# Patient Record
Sex: MALE | Race: BLACK OR AFRICAN AMERICAN | NOT HISPANIC OR LATINO | Employment: OTHER | URBAN - METROPOLITAN AREA
[De-identification: names, ages, dates, MRNs, and addresses within clinical notes are randomized per-mention and may not be internally consistent; named-entity substitution may affect disease eponyms.]

---

## 2017-01-04 ENCOUNTER — TELEPHONE (OUTPATIENT)
Dept: PULMONOLOGY | Facility: CLINIC | Age: 72
End: 2017-01-04

## 2017-01-04 DIAGNOSIS — G47.33 OSA (OBSTRUCTIVE SLEEP APNEA): Primary | ICD-10-CM

## 2017-01-05 NOTE — TELEPHONE ENCOUNTER
----- Message from Sharmila Cadena sent at 1/4/2017  9:12 AM CST -----  Contact: Patient: [phone]  Pt stated he would like to try the CPAP machine again pt was last seen 11/2016   Please advise   ----- Message -----     From: Carmen Mayers     Sent: 1/3/2017  12:25 PM       To: Aaliyah Lino V Staff    Patient is calling in regards to see if he is getting a CPAP machine .  Patient can be reached at [phone]. Please call.    Thanks

## 2017-02-07 ENCOUNTER — OFFICE VISIT (OUTPATIENT)
Dept: UROLOGY | Facility: CLINIC | Age: 72
End: 2017-02-07
Payer: MEDICARE

## 2017-02-07 VITALS — WEIGHT: 249 LBS | BODY MASS INDEX: 40.02 KG/M2 | HEIGHT: 66 IN

## 2017-02-07 DIAGNOSIS — N13.8 BPH WITH OBSTRUCTION/LOWER URINARY TRACT SYMPTOMS: Primary | ICD-10-CM

## 2017-02-07 DIAGNOSIS — N40.1 BPH WITH OBSTRUCTION/LOWER URINARY TRACT SYMPTOMS: Primary | ICD-10-CM

## 2017-02-07 DIAGNOSIS — R35.1 NOCTURIA MORE THAN TWICE PER NIGHT: ICD-10-CM

## 2017-02-07 DIAGNOSIS — R33.9 INCOMPLETE EMPTYING OF BLADDER: ICD-10-CM

## 2017-02-07 PROCEDURE — 99214 OFFICE O/P EST MOD 30 MIN: CPT | Mod: S$GLB,,, | Performed by: UROLOGY

## 2017-02-07 PROCEDURE — 99999 PR PBB SHADOW E&M-EST. PATIENT-LVL III: CPT | Mod: PBBFAC,,, | Performed by: UROLOGY

## 2017-02-07 RX ORDER — DOCUSATE SODIUM 100 MG/1
100 CAPSULE, LIQUID FILLED ORAL 2 TIMES DAILY
COMMUNITY

## 2017-02-07 RX ORDER — ALBUTEROL SULFATE 0.63 MG/3ML
0.63 SOLUTION RESPIRATORY (INHALATION) EVERY 6 HOURS PRN
COMMUNITY
End: 2018-03-15

## 2017-02-07 NOTE — MR AVS SNAPSHOT
[facility]  [address]  [address]  [place] [address]  Phone: [phone]                  Flaquito Jones   2017 11:00 AM   Office Visit    Description:  Male : 1945   Provider:  JEB Lucero MD   Department:  [facility]           Reason for Visit     Benign Prostatic Hypertrophy           Diagnoses this Visit        Comments    BPH with obstruction/lower urinary tract symptoms    -  Primary     Nocturia more than twice per night         Incomplete emptying of bladder                To Do List           Future Appointments        Provider Department Dept Phone    2017 10:00 AM Harry Fernández MD [facility] [phone]      Goals (5 Years of Data)     None      Follow-Up and Disposition     Return in about 6 months (around 2017) for Review PSA.      [facility]     [facility] -  Assistance  Registered nurses in the [facility] provide clinical advisement, health education, appointment booking, and other advisory services.  Call for this free service at [phone].             Medications           Message regarding Medications     Verify the changes and/or additions to your medication regime listed below are the same as discussed with your clinician today.  If any of these changes or additions are incorrect, please notify your healthcare provider.        STOP taking these medications     lisinopril-hydrochlorothiazide (PRINZIDE,ZESTORETIC) 10-12.5 mg per tablet Take 1 tablet by mouth once daily.      lovastatin (MEVACOR) 10 MG tablet Take 10 mg by mouth every evening.    lubiprostone (AMITIZA) 24 MCG Cap Take 24 mcg by mouth 2 (two) times daily with meals.      metoprolol tartrate (LOPRESSOR) 25 MG tablet take 1/2 TABLET(S) BY MOUTH TWICE DAILY    METOPROLOL TARTRATE ORAL Take 15 mg by mouth once daily.    oxycodone-acetaminophen  mg (PERCOCET)  mg per tablet Take 1 tablet by mouth 2  "(two) times daily as needed.      tiotropium (SPIRIVA) 18 mcg inhalation capsule Inhale 18 mcg into the lungs once daily.           Verify that the below list of medications is an accurate representation of the medications you are currently taking.  If none reported, the list may be blank. If incorrect, please contact your healthcare provider. Carry this list with you in case of emergency.           Current Medications     albuterol (ACCUNEB) 0.63 mg/3 mL Nebu Take 0.63 mg by nebulization every 6 (six) hours as needed. Rescue    allopurinol (ZYLOPRIM) 100 MG tablet TAKE 1 TABLET(S) BY MOUTH ONCE A DAY to prevent gout    atorvastatin (LIPITOR) 40 MG tablet Take 40 mg by mouth once daily.    bethanechol (URECHOLINE) 25 MG Tab Take 50 mg by mouth 3 (three) times daily.    bethanechol (URECHOLINE) 50 MG tablet Take 1 tablet (50 mg total) by mouth 3 (three) times daily.    docusate sodium (COLACE) 100 MG capsule Take 100 mg by mouth 2 (two) times daily.    fish oil-omega-3 fatty acids 300-1,000 mg capsule Take 1 g by mouth 2 (two) times daily.      furosemide (LASIX) 40 MG tablet Take 1 tablet (40 mg total) by mouth once daily.    gabapentin (NEURONTIN) 300 MG capsule Take 300 mg by mouth 3 (three) times daily.    multivitamin capsule Take 1 capsule by mouth once daily.    omeprazole (PRILOSEC) 20 MG capsule Take 20 mg by mouth once daily.    tamsulosin (FLOMAX) 0.4 mg Cp24 Take 1 capsule (0.4 mg total) by mouth once daily.    warfarin (COUMADIN) 5 MG tablet            Clinical Reference Information           Your Vitals Were     Height Weight BMI          5' 6" (1.676 m) 112.9 kg (249 lb) 40.19 kg/m2        Allergies as of 2/7/2017     No Known Allergies      Immunizations Administered on Date of Encounter - 2/7/2017     None      Orders Placed During Today's Visit     Future Labs/Procedures Expected by Expires    Prostate Specific Antigen, Diagnostic  8/6/2017 2/7/2018      [facility] Sign-Up     Activating your " [facility] account is as easy as 1-2-3!     1) Visit [email or web address], select Sign Up Now, enter this activation code and your date of birth, then select Next.  [email or web address]  Expires: 3/24/2017 11:14 AM      2) Create a username and password to use when you visit [facility] in the future and select a security question in case you lose your password and select Next.    3) Enter your e-mail address and click Sign Up!    Additional Information  If you have questions, please e-mail [email or web address] or call [phone] to talk to our [facility] staff. Remember, [facility] is NOT to be used for urgent needs. For medical emergencies, dial 911.         Language Assistance Services     ATTENTION: Language assistance services are available, free of charge. Please call [phone].      ATENCIÓN: Si habla español, tiene a hylton disposición servicios gratuitos de asistencia lingüística. Llame al [phone].     CHÚ Ý: N?u b?n nói Ti?ng Vi?t, có các d?ch v? h? tr? ngôn ng? mi?n phí dành cho b?n. G?i s? [phone].         [facility] complies with applicable Federal civil rights laws and does not discriminate on the basis of race, color, national origin, age, disability, or sex.

## 2017-02-07 NOTE — PROGRESS NOTES
Subjective:       Patient ID: Flaquito Jones is a 71 y.o. male who was last seen in this office 8/25/2016    Chief Complaint:   Chief Complaint   Patient presents with    Benign Prostatic Hypertrophy     6 month f/u        History of Present Illness  Benign Prostatic Hypertrophy  Patient complains of lower urinary tract symptoms. He reports frequency, incomplete emptying and nocturia two times a night. He denies straining and urgency. Patient states symptoms are of mild severity. Onset of symptoms was several years ago and was gradual in onset. He has no personal history and no family history of prostate cancer. He reports a history of no complicating symptoms. He denies flank pain, gross hematuria, kidney stones and recurrent UTI.  He does well with Flomax and Bethanechol.    ACTIVE MEDICAL ISSUES:  Patient Active Problem List   Diagnosis    Thoracic or lumbosacral neuritis or radiculitis, unspecified    DIVYA (obstructive sleep apnea)    Chronic diastolic congestive heart failure    Chest pain, atypical    Essential hypertension    HLD (hyperlipidemia)    DVT (deep venous thrombosis)       ALLERGIES AND MEDICATIONS: updated and reviewed.  Review of patient's allergies indicates:  No Known Allergies  Current Outpatient Prescriptions   Medication Sig    albuterol (ACCUNEB) 0.63 mg/3 mL Nebu Take 0.63 mg by nebulization every 6 (six) hours as needed. Rescue    allopurinol (ZYLOPRIM) 100 MG tablet TAKE 1 TABLET(S) BY MOUTH ONCE A DAY to prevent gout    atorvastatin (LIPITOR) 40 MG tablet Take 40 mg by mouth once daily.    bethanechol (URECHOLINE) 25 MG Tab Take 50 mg by mouth 3 (three) times daily.    bethanechol (URECHOLINE) 50 MG tablet Take 1 tablet (50 mg total) by mouth 3 (three) times daily.    docusate sodium (COLACE) 100 MG capsule Take 100 mg by mouth 2 (two) times daily.    fish oil-omega-3 fatty acids 300-1,000 mg capsule Take 1 g by mouth 2 (two) times daily.      furosemide (LASIX) 40 MG  "tablet Take 1 tablet (40 mg total) by mouth once daily.    gabapentin (NEURONTIN) 300 MG capsule Take 300 mg by mouth 3 (three) times daily.    multivitamin capsule Take 1 capsule by mouth once daily.    omeprazole (PRILOSEC) 20 MG capsule Take 20 mg by mouth once daily.    tamsulosin (FLOMAX) 0.4 mg Cp24 Take 1 capsule (0.4 mg total) by mouth once daily.    warfarin (COUMADIN) 5 MG tablet      No current facility-administered medications for this visit.        Review of Systems   Constitutional: Negative for activity change, fatigue, fever and unexpected weight change.   HENT: Negative for congestion.    Eyes: Negative for redness.   Respiratory: Negative for chest tightness and shortness of breath.    Cardiovascular: Negative for chest pain and leg swelling.   Gastrointestinal: Negative for abdominal pain, constipation, diarrhea, nausea and vomiting.   Genitourinary: Negative for dysuria, flank pain, frequency, hematuria, penile pain, penile swelling, scrotal swelling, testicular pain and urgency.   Musculoskeletal: Negative for arthralgias and back pain.   Neurological: Negative for dizziness and light-headedness.   Psychiatric/Behavioral: Negative for behavioral problems and confusion. The patient is not nervous/anxious.    All other systems reviewed and are negative.      Objective:      Vitals:    02/07/17 1107   Weight: 112.9 kg (249 lb)   Height: 5' 6" (1.676 m)     Physical Exam   Nursing note and vitals reviewed.  Constitutional: He is oriented to person, place, and time. He appears well-developed and well-nourished.   HENT:   Head: Normocephalic.   Eyes: Conjunctivae are normal.   Neck: Normal range of motion. Neck supple. No tracheal deviation present. No thyromegaly present.   Cardiovascular: Normal rate and normal heart sounds.    Pulmonary/Chest: Effort normal and breath sounds normal. No respiratory distress. He has no wheezes.   Abdominal: Soft. Bowel sounds are normal. There is no " hepatosplenomegaly. There is no tenderness. There is no rebound and no CVA tenderness. No hernia.   Musculoskeletal: Normal range of motion. He exhibits no edema or tenderness.   Wheel chair   Lymphadenopathy:     He has no cervical adenopathy.   Neurological: He is alert and oriented to person, place, and time.   Skin: Skin is warm and dry. No rash noted. No erythema.     Psychiatric: He has a normal mood and affect. His behavior is normal. Judgment and thought content normal.       Urine dipstick shows negative for all components.  Micro exam: negative for WBC's or RBC's.    Assessment:       1. BPH with obstruction/lower urinary tract symptoms    2. Nocturia more than twice per night    3. Incomplete emptying of bladder          Plan:       1. BPH with obstruction/lower urinary tract symptoms  Stay on Flomax and bethanechol    - Prostate Specific Antigen, Diagnostic; Future    2. Nocturia more than twice per night      3. Incomplete emptying of bladder            Return in about 6 months (around 8/7/2017) for Review PSA.

## 2017-03-09 ENCOUNTER — TELEPHONE (OUTPATIENT)
Dept: SLEEP MEDICINE | Facility: CLINIC | Age: 72
End: 2017-03-09

## 2017-03-09 NOTE — TELEPHONE ENCOUNTER
----- Message from Carmen Mayers sent at 3/9/2017  9:35 AM CST -----  Contact: Patient: [phone]  Patient is with EMS and is asking to be called back on [phone].    Thanks

## 2017-05-04 DIAGNOSIS — J44.9 COPD (CHRONIC OBSTRUCTIVE PULMONARY DISEASE): Primary | ICD-10-CM

## 2017-05-05 ENCOUNTER — HOSPITAL ENCOUNTER (OUTPATIENT)
Dept: RESPIRATORY THERAPY | Facility: HOSPITAL | Age: 72
Discharge: HOME | End: 2017-05-05
Payer: MEDICARE

## 2017-05-05 DIAGNOSIS — J44.9 COPD (CHRONIC OBSTRUCTIVE PULMONARY DISEASE): ICD-10-CM

## 2017-05-05 PROCEDURE — 94729 DIFFUSING CAPACITY: CPT

## 2017-05-05 PROCEDURE — 94010 BREATHING CAPACITY TEST: CPT

## 2017-05-05 PROCEDURE — 94727 GAS DIL/WSHOT DETER LNG VOL: CPT

## 2017-08-08 ENCOUNTER — OFFICE VISIT (OUTPATIENT)
Dept: UROLOGY | Facility: CLINIC | Age: 72
End: 2017-08-08
Payer: MEDICARE

## 2017-08-08 ENCOUNTER — TELEPHONE (OUTPATIENT)
Dept: UROLOGY | Facility: CLINIC | Age: 72
End: 2017-08-08

## 2017-08-08 VITALS
SYSTOLIC BLOOD PRESSURE: 122 MMHG | DIASTOLIC BLOOD PRESSURE: 72 MMHG | BODY MASS INDEX: 40.02 KG/M2 | HEART RATE: 62 BPM | WEIGHT: 249 LBS | HEIGHT: 66 IN

## 2017-08-08 DIAGNOSIS — N40.0 BPH WITHOUT URINARY OBSTRUCTION: Primary | ICD-10-CM

## 2017-08-08 DIAGNOSIS — R33.9 INCOMPLETE EMPTYING OF BLADDER: ICD-10-CM

## 2017-08-08 DIAGNOSIS — R35.1 NOCTURIA MORE THAN TWICE PER NIGHT: ICD-10-CM

## 2017-08-08 PROCEDURE — 1126F AMNT PAIN NOTED NONE PRSNT: CPT | Mod: S$GLB,,, | Performed by: UROLOGY

## 2017-08-08 PROCEDURE — 99214 OFFICE O/P EST MOD 30 MIN: CPT | Mod: S$GLB,,, | Performed by: UROLOGY

## 2017-08-08 PROCEDURE — 99999 PR PBB SHADOW E&M-EST. PATIENT-LVL III: CPT | Mod: PBBFAC,,, | Performed by: UROLOGY

## 2017-08-08 PROCEDURE — 1159F MED LIST DOCD IN RCRD: CPT | Mod: S$GLB,,, | Performed by: UROLOGY

## 2017-08-08 PROCEDURE — 3074F SYST BP LT 130 MM HG: CPT | Mod: S$GLB,,, | Performed by: UROLOGY

## 2017-08-08 PROCEDURE — 3078F DIAST BP <80 MM HG: CPT | Mod: S$GLB,,, | Performed by: UROLOGY

## 2017-08-08 RX ORDER — BETHANECHOL CHLORIDE 25 MG/1
50 TABLET ORAL 3 TIMES DAILY
Qty: 180 TABLET | Refills: 1 | Status: SHIPPED | OUTPATIENT
Start: 2017-08-08 | End: 2017-12-08 | Stop reason: SDUPTHER

## 2017-08-08 RX ORDER — TAMSULOSIN HYDROCHLORIDE 0.4 MG/1
0.4 CAPSULE ORAL DAILY
Qty: 90 CAPSULE | Refills: 3 | Status: SHIPPED | OUTPATIENT
Start: 2017-08-08 | End: 2018-03-15 | Stop reason: SDUPTHER

## 2017-08-08 NOTE — TELEPHONE ENCOUNTER
----- Message from Amanda Cramer sent at 8/8/2017  2:32 PM CDT -----  Contact: self  Requesting to speak to [place] - Refused to state the reason why except  that it is about labwork .      [phone]              LL

## 2017-08-08 NOTE — TELEPHONE ENCOUNTER
Patient wanted lab slip mailed for [facility] so he can get his labs in [facility].

## 2017-08-08 NOTE — PROGRESS NOTES
Subjective:       Patient ID: Flaquito Jones is a 72 y.o. male who was last seen in this office Visit date not found    Chief Complaint:   Chief Complaint   Patient presents with    Benign Prostatic Hypertrophy     6 month f/u        History of Present Illness  Benign Prostatic Hypertrophy  Patient complains of lower urinary tract symptoms. He reports frequency, incomplete emptying and nocturia two times a night. He denies straining and urgency. Patient states symptoms are of mild severity. Onset of symptoms was several years ago and was gradual in onset. He has no personal history and no family history of prostate cancer. He reports a history of no complicating symptoms. He denies flank pain, gross hematuria, kidney stones and recurrent UTI.  He does well with Flomax and Bethanechol.      ACTIVE MEDICAL ISSUES:  Patient Active Problem List   Diagnosis    Thoracic or lumbosacral neuritis or radiculitis, unspecified    DIVYA (obstructive sleep apnea)    Chronic diastolic congestive heart failure    Chest pain, atypical    Essential hypertension    HLD (hyperlipidemia)    DVT (deep venous thrombosis)       ALLERGIES AND MEDICATIONS: updated and reviewed.  Review of patient's allergies indicates:  No Known Allergies  Current Outpatient Prescriptions   Medication Sig    albuterol (ACCUNEB) 0.63 mg/3 mL Nebu Take 0.63 mg by nebulization every 6 (six) hours as needed. Rescue    allopurinol (ZYLOPRIM) 100 MG tablet TAKE 1 TABLET(S) BY MOUTH ONCE A DAY to prevent gout    atorvastatin (LIPITOR) 40 MG tablet Take 40 mg by mouth once daily.    bethanechol (URECHOLINE) 25 MG Tab Take 2 tablets (50 mg total) by mouth 3 (three) times daily.    docusate sodium (COLACE) 100 MG capsule Take 100 mg by mouth 2 (two) times daily.    fish oil-omega-3 fatty acids 300-1,000 mg capsule Take 1 g by mouth 2 (two) times daily.      gabapentin (NEURONTIN) 300 MG capsule Take 300 mg by mouth 3 (three) times daily.     "multivitamin capsule Take 1 capsule by mouth once daily.    omeprazole (PRILOSEC) 20 MG capsule Take 20 mg by mouth once daily.    tamsulosin (FLOMAX) 0.4 mg Cp24 Take 1 capsule (0.4 mg total) by mouth once daily.    warfarin (COUMADIN) 5 MG tablet     furosemide (LASIX) 40 MG tablet Take 1 tablet (40 mg total) by mouth once daily.     No current facility-administered medications for this visit.        Review of Systems   Constitutional: Negative for activity change, fatigue, fever and unexpected weight change.   HENT: Negative for congestion.    Eyes: Negative for redness.   Respiratory: Negative for chest tightness and shortness of breath.    Cardiovascular: Negative for chest pain and leg swelling.   Gastrointestinal: Negative for abdominal pain, constipation, diarrhea, nausea and vomiting.   Genitourinary: Negative for dysuria, flank pain, frequency, hematuria, penile pain, penile swelling, scrotal swelling, testicular pain and urgency.   Musculoskeletal: Negative for arthralgias and back pain.   Neurological: Negative for dizziness and light-headedness.   Psychiatric/Behavioral: Negative for behavioral problems and confusion. The patient is not nervous/anxious.    All other systems reviewed and are negative.      Objective:      Vitals:    08/08/17 1123   BP: 122/72   Pulse: 62   Weight: 112.9 kg (249 lb)   Height: 5' 6" (1.676 m)     Physical Exam   Nursing note and vitals reviewed.  Constitutional: He is oriented to person, place, and time. He appears well-developed and well-nourished.   HENT:   Head: Normocephalic.   Eyes: Conjunctivae are normal.   Neck: Normal range of motion. Neck supple. No tracheal deviation present. No thyromegaly present.   Cardiovascular: Normal rate and normal heart sounds.    Pulmonary/Chest: Effort normal and breath sounds normal. No respiratory distress. He has no wheezes.   Abdominal: Soft. Bowel sounds are normal. There is no hepatosplenomegaly. There is no tenderness. " There is no rebound and no CVA tenderness. No hernia.   Musculoskeletal: Normal range of motion. He exhibits no edema or tenderness.   Wheel chair   Lymphadenopathy:     He has no cervical adenopathy.   Neurological: He is alert and oriented to person, place, and time.   Skin: Skin is warm and dry. No rash noted. No erythema.     Psychiatric: He has a normal mood and affect. His behavior is normal. Judgment and thought content normal.       Urine dipstick shows negative for all components.  Micro exam: negative for WBC's or RBC's.    Assessment:       1. BPH without urinary obstruction    2. Incomplete emptying of bladder    3. Nocturia more than twice per night          Plan:       1. BPH without urinary obstruction  PSA   - tamsulosin (FLOMAX) 0.4 mg Cp24; Take 1 capsule (0.4 mg total) by mouth once daily.  Dispense: 90 capsule; Refill: 3  - bethanechol (URECHOLINE) 25 MG Tab; Take 2 tablets (50 mg total) by mouth 3 (three) times daily.  Dispense: 180 tablet; Refill: 1    2. Incomplete emptying of bladder      3. Nocturia more than twice per night  stable            Return in about 6 months (around 2/8/2018) for Follow up.

## 2017-11-15 ENCOUNTER — OFFICE VISIT (OUTPATIENT)
Dept: NEPHROLOGY | Facility: CLINIC | Age: 72
End: 2017-11-15
Payer: MEDICARE

## 2017-11-15 ENCOUNTER — LAB VISIT (OUTPATIENT)
Dept: LAB | Facility: HOSPITAL | Age: 72
End: 2017-11-15
Attending: INTERNAL MEDICINE
Payer: MEDICARE

## 2017-11-15 VITALS
SYSTOLIC BLOOD PRESSURE: 142 MMHG | WEIGHT: 245.13 LBS | HEIGHT: 66 IN | BODY MASS INDEX: 39.4 KG/M2 | HEART RATE: 72 BPM | DIASTOLIC BLOOD PRESSURE: 70 MMHG | OXYGEN SATURATION: 93 %

## 2017-11-15 DIAGNOSIS — N18.30 CHRONIC KIDNEY DISEASE, STAGE III (MODERATE): Primary | ICD-10-CM

## 2017-11-15 DIAGNOSIS — N18.30 CHRONIC KIDNEY DISEASE, STAGE III (MODERATE): ICD-10-CM

## 2017-11-15 LAB
BILIRUB UR QL STRIP: NEGATIVE
CLARITY UR REFRACT.AUTO: ABNORMAL
COLOR UR AUTO: YELLOW
CREAT UR-MCNC: 172 MG/DL
GLUCOSE UR QL STRIP: NEGATIVE
HGB UR QL STRIP: NEGATIVE
KETONES UR QL STRIP: NEGATIVE
LEUKOCYTE ESTERASE UR QL STRIP: NEGATIVE
NITRITE UR QL STRIP: NEGATIVE
PH UR STRIP: 6 [PH] (ref 5–8)
PROT UR QL STRIP: NEGATIVE
PROT UR-MCNC: 13 MG/DL
PROT/CREAT RATIO, UR: 0.08
SP GR UR STRIP: 1.02 (ref 1–1.03)
URN SPEC COLLECT METH UR: ABNORMAL
UROBILINOGEN UR STRIP-ACNC: 4 EU/DL

## 2017-11-15 PROCEDURE — 99999 PR PBB SHADOW E&M-EST. PATIENT-LVL III: CPT | Mod: PBBFAC,,, | Performed by: INTERNAL MEDICINE

## 2017-11-15 PROCEDURE — 81003 URINALYSIS AUTO W/O SCOPE: CPT

## 2017-11-15 PROCEDURE — 87086 URINE CULTURE/COLONY COUNT: CPT

## 2017-11-15 PROCEDURE — 87077 CULTURE AEROBIC IDENTIFY: CPT | Mod: 59

## 2017-11-15 PROCEDURE — 82570 ASSAY OF URINE CREATININE: CPT

## 2017-11-15 PROCEDURE — 99213 OFFICE O/P EST LOW 20 MIN: CPT | Mod: S$GLB,,, | Performed by: INTERNAL MEDICINE

## 2017-11-15 PROCEDURE — 87088 URINE BACTERIA CULTURE: CPT

## 2017-11-15 PROCEDURE — 87186 SC STD MICRODIL/AGAR DIL: CPT | Mod: 59

## 2017-11-20 NOTE — PROGRESS NOTES
Subjective:       Patient ID: Flaquito Jones is a 72 y.o. Black or  male who presents for follow up of Chronic Kidney Disease    HPI     Mr. Jones is a 72 year old man with past medical history of hypertension, BPH presenting for follow up of chronic kidney disease.  He reports adequate daily fluid intake, denies any NSAID use.  He otherwise denies any fever, chest pain, shortness of breath, abdominal pain, diarrhea, dysuria/hematuria.     Review of Systems   Constitutional: Negative for appetite change, fatigue and fever.   Respiratory: Negative for cough and shortness of breath.    Cardiovascular: Negative for chest pain and leg swelling.   Gastrointestinal: Negative for abdominal pain, constipation, diarrhea, nausea and vomiting.   Genitourinary: Negative for dysuria, flank pain, frequency, hematuria and urgency.   Musculoskeletal: Negative for arthralgias, back pain and joint swelling.   Skin: Negative for rash.   Neurological: Negative for dizziness and light-headedness.   All other systems reviewed and are negative.      Objective:      Physical Exam   Constitutional: He appears well-developed and well-nourished.   Cardiovascular: Normal rate and regular rhythm.  Exam reveals no gallop and no friction rub.    No murmur heard.  Pulmonary/Chest: Effort normal and breath sounds normal. No respiratory distress. He has no wheezes. He has no rales.   Musculoskeletal: He exhibits no edema.   Neurological: He is alert.   Skin: Skin is warm and dry. No rash noted.   Vitals reviewed.      Assessment:       1. Chronic kidney disease, stage III (moderate)        Plan:      Mr. Jones is a 72 year old man with past medical history of hypertension, BPH presenting for follow up of chronic kidney disease stage III.  Patient creatinine previously 0.9-1.2mg/dL, recently noted to be 1.6-1.7mg/dL.  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 acute kidney injury due to obstruction v.  infection), will repeat renal panel to trend.  Further recommendations pending results of above, patient agreeable to plan as noted.     Return to clinic in 4-6 months pending renal panel, then with renal/heme panel, iron/TIBC/ferritin, urinalysis/culture, urine protein/creatinine ratio, PTH/VitD prior to next visit

## 2017-11-22 LAB
BACTERIA UR CULT: NORMAL
BACTERIA UR CULT: NORMAL

## 2017-12-08 DIAGNOSIS — N40.0 BPH WITHOUT URINARY OBSTRUCTION: ICD-10-CM

## 2017-12-08 RX ORDER — BETHANECHOL CHLORIDE 25 MG/1
50 TABLET ORAL 3 TIMES DAILY
Qty: 180 TABLET | Refills: 1 | Status: SHIPPED | OUTPATIENT
Start: 2017-12-08 | End: 2018-03-15 | Stop reason: SDUPTHER

## 2018-01-19 ENCOUNTER — HOSPITAL ENCOUNTER (OUTPATIENT)
Dept: RADIOLOGY | Facility: HOSPITAL | Age: 73
Discharge: HOME | End: 2018-01-19
Attending: INTERNAL MEDICINE
Payer: MEDICARE

## 2018-01-19 ENCOUNTER — OFFICE VISIT (OUTPATIENT)
Dept: SLEEP MEDICINE | Facility: CLINIC | Age: 73
End: 2018-01-19
Payer: MEDICARE

## 2018-01-19 VITALS
SYSTOLIC BLOOD PRESSURE: 140 MMHG | HEART RATE: 86 BPM | OXYGEN SATURATION: 89 % | BODY MASS INDEX: 40.57 KG/M2 | WEIGHT: 252.44 LBS | HEIGHT: 66 IN | DIASTOLIC BLOOD PRESSURE: 68 MMHG

## 2018-01-19 DIAGNOSIS — R06.09 DYSPNEA ON EXERTION: ICD-10-CM

## 2018-01-19 DIAGNOSIS — G47.33 OSA (OBSTRUCTIVE SLEEP APNEA): ICD-10-CM

## 2018-01-19 DIAGNOSIS — J98.6 DIAPHRAGMATIC PARESIS: ICD-10-CM

## 2018-01-19 DIAGNOSIS — R06.09 DYSPNEA ON EXERTION: Primary | ICD-10-CM

## 2018-01-19 PROCEDURE — 71046 X-RAY EXAM CHEST 2 VIEWS: CPT | Mod: TC,PO

## 2018-01-19 PROCEDURE — 71046 X-RAY EXAM CHEST 2 VIEWS: CPT | Mod: 26,,, | Performed by: RADIOLOGY

## 2018-01-19 PROCEDURE — 99214 OFFICE O/P EST MOD 30 MIN: CPT | Mod: S$GLB,,, | Performed by: INTERNAL MEDICINE

## 2018-01-19 PROCEDURE — 99999 PR PBB SHADOW E&M-EST. PATIENT-LVL III: CPT | Mod: PBBFAC,,, | Performed by: INTERNAL MEDICINE

## 2018-01-19 NOTE — PROGRESS NOTES
Flaquito Jones  was seen as a follow up.    CHIEF COMPLAINT:  Sleep Apnea; Follow-up; Pneumonia; and abnormal chest xray      HISTORY OF PRESENT ILLNESS: Flaquito Jones is a 72 y.o. male with pmh of tobacco abuse (40 pack years), djd, htn, dyslipidemia is here for pulmonary evaluation.  Our first encounter was 3/31/16.  Patient smoke 1 ppd x 40 years.  Quit 2015.  +chronic suh x 1/2 block.  Limited exertion due to back pain and balance issue.  No fever/chills.  No coughing.  Currently on albuterol and spiriva.  No chest pain.  No orthopnea.  Sleep on side due to back pain.  No pnd.  Chronically anticoagulate for dvt/pe.  +chronic lower extremities swelling.      +loud snoring.  No witnessed sleep apnea.  +frequent awakening for urination.  +fatigue upon awake daily.  Sleep a lot during the day.      S/p sleep study 5/16 with severe earline.  Titration was poor due to high leak.  Repeat titration showed improve ahi with bipap of 19/10.  BPAP was ordered.  Patient returned bipap after 1 day.   Patient s/p ct with eventration of right hemidiaphragm.  Patient s/p sniff test indicating right diaphragm paralysis.  Patient returned bpap after 1 week.  Currently, patient is using cpap from wife's mother.      Patient was diagnosed with pneumonia by Dr. Westfall.  Patient was sent to Dr. Evans.  Patient was treated with amoxicillin and levaquin.  Currently back to baseline.      PAST MEDICAL HISTORY:    Active Ambulatory Problems     Diagnosis Date Noted    Thoracic or lumbosacral neuritis or radiculitis, unspecified 11/27/2012    EARLINE (obstructive sleep apnea)     Chronic diastolic congestive heart failure 05/31/2016    Chest pain, atypical 05/31/2016    Essential hypertension 05/31/2016    HLD (hyperlipidemia) 05/31/2016    DVT (deep venous thrombosis) 05/31/2016     Resolved Ambulatory Problems     Diagnosis Date Noted    No Resolved Ambulatory Problems     Past Medical History:   Diagnosis Date    High cholesterol      High triglycerides     Hypertension     Lymphedema     Pulmonary embolism 2014    S/P injection of intervertebral space for herniated disc                 PAST SURGICAL HISTORY:    Past Surgical History:   Procedure Laterality Date    BACK SURGERY      CERVICAL FUSION      2006         FAMILY HISTORY:                Family History   Problem Relation Age of Onset    Family history unknown: Yes       SOCIAL HISTORY:          Tobacco:   History   Smoking Status    Former Smoker    Packs/day: 1.00    Years: 40.00    Quit date: 3/31/2015   Smokeless Tobacco    Never Used     Comment: pt quit smoking year ago     alcohol use:    History   Alcohol Use No               Occupation:  [employer] on [employer]    ALLERGIES:  No Known Allergies    CURRENT MEDICATIONS:    Current Outpatient Prescriptions   Medication Sig Dispense Refill    albuterol (ACCUNEB) 0.63 mg/3 mL Nebu Take 0.63 mg by nebulization every 6 (six) hours as needed. Rescue      allopurinol (ZYLOPRIM) 100 MG tablet TAKE 1 TABLET(S) BY MOUTH ONCE A DAY to prevent gout  6    atorvastatin (LIPITOR) 40 MG tablet Take 40 mg by mouth once daily.  11    bethanechol (URECHOLINE) 25 MG Tab Take 2 tablets (50 mg total) by mouth 3 (three) times daily. 180 tablet 1    docusate sodium (COLACE) 100 MG capsule Take 100 mg by mouth 2 (two) times daily.      fish oil-omega-3 fatty acids 300-1,000 mg capsule Take 1 g by mouth 2 (two) times daily.        gabapentin (NEURONTIN) 300 MG capsule Take 300 mg by mouth 3 (three) times daily.      multivitamin capsule Take 1 capsule by mouth once daily.      PANTOPRAZOLE SODIUM (PROTONIX ORAL) Take by mouth.      tamsulosin (FLOMAX) 0.4 mg Cp24 Take 1 capsule (0.4 mg total) by mouth once daily. 90 capsule 3    warfarin (COUMADIN) 5 MG tablet   3    furosemide (LASIX) 40 MG tablet Take 1 tablet (40 mg total) by mouth once daily. 60 tablet 3     No current facility-administered medications for this visit.                "    REVIEW OF SYSTEMS:   No acute changes from previous encounter dated 3/31/2016 with exceptions mentioned in HPI.             PHYSICAL EXAM:  Vitals:    01/19/18 1111   BP: (!) 140/68   Pulse: 86   SpO2: (!) 89%   Weight: 114.5 kg (252 lb 6.8 oz)   Height: 5' 6" (1.676 m)   PainSc: 0-No pain     Body mass index is 40.74 kg/m².     GENERAL:  well develop; no apparent distress  HEENT:  no nasal congestion; no discharge noted; class 4 modified mallampatti.  Edentulous.   NECK:  supple; no palpable masses.  CARDIO: regular rate and rhythm  PULM:  clear to auscultation bilaterally; no intercostals retractions; no accessory muscle usage   ABDOMEN:  soft nontender/nondistended.  +bowel sound  EXTREMITIES no cc; +lower extremities edema  NEURO:  CN II-XII intact.  5/5 motor in all extremities.  sensation grossly intact   to light touch.  PSYCH:  normal affect.  Alert and oriented x 4    LABS  Pulmonary Functions Testing Results: 4/4/16 poor effort; ratio 59%; fvc 50%; fev1 44%  ABG none  CXR:  3/31/16 basilar atelectasis  CT CHEST:  - no honeycombing.  No effusion or consolidation  Echo 4/4/16 ef 45%; concentric remodelling    Split 5/13/16 ahi of 114 with min sat of 74%; optimal cpap was not achieved.    Titration 8/8/16 optimal bipap of 19/10  Ct 4/29/16 elevation of right hemidiaphragm  Sniff +limited excursion of right hemidiaphragm  ASSESSMENT    ICD-10-CM ICD-9-CM    1. Dyspnea on exertion R06.09 786.09 X-Ray Chest PA And Lateral      Brain natriuretic peptide   2. 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 DIVYA (obstructive sleep apnea) G47.33 327.23 CPAP Titration (Must have dx of DIVYA from previous sleep study.)       PLAN:  Dyspnea - chronic and most likely multifactorial.  HFrEF + habitus + diastolic dysfunction + decondition.  Echo suggestive of diastolic dysfunction, reduce ef with elevated bnp.  pft with restrictive physiololgy.  CT without significant parenchymal lung disease.  +right hemidiaphragm paresis.  " Will repeat cxr and bnp.      chf - mildly decreased ef with evidence of diastolic dysfunction.  Follow by Dr. Zee    earline -  Severe earline.  D/w patient at length.  Need treatment due to severity.  Patient returned bipap after 1 day.  Second trial lasted 1 week. Patient is currently with late mother-in-law cpap of 8 and tolerating it better.  Patient is aware that lower pressure is not effective.  Will bring back for retitration.  Desensitization protocol d/w patient.      Diaphragm paresis - documented on sniff.  Difficulty with supine position.  More comfortable on right side.  Await bipap.    Copd - currently on spiriva and albuterol.      Obesity - aware of need for drastic weight loss.  Difficult with exercise due djd and dyspnea    dispostion - recommend patient to form living.        Patient will Follow-up after sleep study.

## 2018-01-24 ENCOUNTER — TELEPHONE (OUTPATIENT)
Dept: PULMONOLOGY | Facility: CLINIC | Age: 73
End: 2018-01-24

## 2018-01-24 NOTE — TELEPHONE ENCOUNTER
Spoke with pt. The pt stated that his Cardiologist stated to the pt that his CXR looks like he has the beginning of pneumonia. The pt asked if it was shown on the new CXR just recently taken. I advised the pt per the provider that the pt's CXR and blood tests show no new abnormalities and it still looks the same as last year. The pt stated that he agreed and understood with verbal read back.

## 2018-01-24 NOTE — TELEPHONE ENCOUNTER
----- Message from Nadeen Michael sent at 1/24/2018  8:19 AM CST -----  Contact: Pt   [phone]  Pt is returning the nurse phone call , give the pt a call back

## 2018-01-24 NOTE — TELEPHONE ENCOUNTER
----- Message from Holland Fischer MD sent at 1/23/2018  3:32 PM CST -----  Contact: Pt  [phone]  Please inform patient that the cxr and blood work did not reveal any new findings.  CXR looked the same as last year cxr and blood work was normal.   thanks  ----- Message -----  From: Christen Correa MA  Sent: 1/23/2018  11:55 AM  To: Holland Fischer MD        ----- Message -----  From: Nadeen Michael  Sent: 1/23/2018  11:52 AM  To: Aaliyah Fischer  /  Pt calling to get test result from previous  visit , Call the pt back to discuss   Thanks,

## 2018-02-08 ENCOUNTER — TELEPHONE (OUTPATIENT)
Dept: SLEEP MEDICINE | Facility: OTHER | Age: 73
End: 2018-02-08

## 2018-03-15 ENCOUNTER — OFFICE VISIT (OUTPATIENT)
Dept: UROLOGY | Facility: CLINIC | Age: 73
End: 2018-03-15
Payer: MEDICARE

## 2018-03-15 ENCOUNTER — TELEPHONE (OUTPATIENT)
Dept: SLEEP MEDICINE | Facility: OTHER | Age: 73
End: 2018-03-15

## 2018-03-15 VITALS — WEIGHT: 252 LBS | BODY MASS INDEX: 40.5 KG/M2 | HEIGHT: 66 IN

## 2018-03-15 DIAGNOSIS — N13.8 BPH WITH OBSTRUCTION/LOWER URINARY TRACT SYMPTOMS: Primary | ICD-10-CM

## 2018-03-15 DIAGNOSIS — R33.9 INCOMPLETE EMPTYING OF BLADDER: ICD-10-CM

## 2018-03-15 DIAGNOSIS — N40.0 BPH WITHOUT URINARY OBSTRUCTION: ICD-10-CM

## 2018-03-15 DIAGNOSIS — N40.1 BPH WITH OBSTRUCTION/LOWER URINARY TRACT SYMPTOMS: Primary | ICD-10-CM

## 2018-03-15 DIAGNOSIS — R35.1 NOCTURIA MORE THAN TWICE PER NIGHT: ICD-10-CM

## 2018-03-15 PROCEDURE — 99213 OFFICE O/P EST LOW 20 MIN: CPT | Mod: S$GLB,,, | Performed by: UROLOGY

## 2018-03-15 PROCEDURE — 99999 PR PBB SHADOW E&M-EST. PATIENT-LVL II: CPT | Mod: PBBFAC,,, | Performed by: UROLOGY

## 2018-03-15 RX ORDER — TAMSULOSIN HYDROCHLORIDE 0.4 MG/1
0.4 CAPSULE ORAL DAILY
Qty: 90 CAPSULE | Refills: 3 | Status: SHIPPED | OUTPATIENT
Start: 2018-03-15 | End: 2018-09-20 | Stop reason: SDUPTHER

## 2018-03-15 RX ORDER — HYDRALAZINE HYDROCHLORIDE 25 MG/1
25 TABLET, FILM COATED ORAL 3 TIMES DAILY
COMMUNITY

## 2018-03-15 RX ORDER — VIT C/E/ZN/COPPR/LUTEIN/ZEAXAN 250MG-90MG
1000 CAPSULE ORAL DAILY
COMMUNITY

## 2018-03-15 RX ORDER — BETHANECHOL CHLORIDE 25 MG/1
50 TABLET ORAL 3 TIMES DAILY
Qty: 180 TABLET | Refills: 1 | Status: SHIPPED | OUTPATIENT
Start: 2018-03-15 | End: 2018-09-20 | Stop reason: SDUPTHER

## 2018-03-15 RX ORDER — ROSUVASTATIN CALCIUM 20 MG/1
20 TABLET, COATED ORAL DAILY
COMMUNITY

## 2018-03-15 RX ORDER — FUROSEMIDE 40 MG/1
40 TABLET ORAL 2 TIMES DAILY
COMMUNITY

## 2018-03-15 RX ORDER — IPRATROPIUM BROMIDE 0.5 MG/2.5ML
500 SOLUTION RESPIRATORY (INHALATION) 4 TIMES DAILY
COMMUNITY

## 2018-03-15 RX ORDER — TIOTROPIUM BROMIDE 18 UG/1
18 CAPSULE ORAL; RESPIRATORY (INHALATION) DAILY
COMMUNITY

## 2018-03-15 NOTE — PROGRESS NOTES
Subjective:       Patient ID: Flaquito Jones is a 72 y.o. male who was last seen in this office Visit date not found    Chief Complaint:   Chief Complaint   Patient presents with    Benign Prostatic Hypertrophy     6 month f/u with psa        History of Present Illness  Benign Prostatic Hypertrophy  Patient complains of lower urinary tract symptoms. He reports frequency, incomplete emptying and nocturia two times a night. He denies straining and urgency. Patient states symptoms are of mild severity. Onset of symptoms was several years ago and was gradual in onset. He has no personal history and no family history of prostate cancer. He reports a history of no complicating symptoms. He denies flank pain, gross hematuria, kidney stones and recurrent UTI.  He does well with Flomax and Bethanechol.    ACTIVE MEDICAL ISSUES:  Patient Active Problem List   Diagnosis    Thoracic or lumbosacral neuritis or radiculitis, unspecified    DIVYA (obstructive sleep apnea)    Chronic diastolic congestive heart failure    Chest pain, atypical    Essential hypertension    HLD (hyperlipidemia)    DVT (deep venous thrombosis)       ALLERGIES AND MEDICATIONS: updated and reviewed.  Review of patient's allergies indicates:  No Known Allergies  Current Outpatient Prescriptions   Medication Sig    allopurinol (ZYLOPRIM) 100 MG tablet TAKE 1 TABLET(S) BY MOUTH ONCE A DAY to prevent gout    bethanechol (URECHOLINE) 25 MG Tab Take 2 tablets (50 mg total) by mouth 3 (three) times daily.    cholecalciferol, vitamin D3, (VITAMIN D3) 1,000 unit capsule Take 1,000 Units by mouth once daily.    CRANBERRY FRUIT EXTRACT (CRANBERRY CONCENTRATE ORAL) Take by mouth.    docusate sodium (COLACE) 100 MG capsule Take 100 mg by mouth 2 (two) times daily.    furosemide (LASIX) 40 MG tablet Take 40 mg by mouth 2 (two) times daily.    gabapentin (NEURONTIN) 300 MG capsule Take 300 mg by mouth 3 (three) times daily.    hydrALAZINE (APRESOLINE)  "25 MG tablet Take 25 mg by mouth 3 (three) times daily.    ipratropium (ATROVENT) 0.02 % nebulizer solution Take 500 mcg by nebulization 4 (four) times daily. Rescue    ranitidine (ZANTAC) 150 MG tablet Take 150 mg by mouth 2 (two) times daily.    rosuvastatin (CRESTOR) 20 MG tablet Take 20 mg by mouth once daily.    tamsulosin (FLOMAX) 0.4 mg Cp24 Take 1 capsule (0.4 mg total) by mouth once daily.    tiotropium (SPIRIVA) 18 mcg inhalation capsule Inhale 18 mcg into the lungs once daily. Controller    warfarin (COUMADIN) 5 MG tablet      No current facility-administered medications for this visit.        Review of Systems   Constitutional: Negative for activity change, fatigue, fever and unexpected weight change.   HENT: Negative for congestion.    Eyes: Negative for redness.   Respiratory: Negative for chest tightness and shortness of breath.    Cardiovascular: Negative for chest pain and leg swelling.   Gastrointestinal: Negative for abdominal pain, constipation, diarrhea, nausea and vomiting.   Genitourinary: Negative for dysuria, flank pain, frequency, hematuria, penile pain, penile swelling, scrotal swelling, testicular pain and urgency.   Musculoskeletal: Negative for arthralgias and back pain.   Neurological: Negative for dizziness and light-headedness.   Psychiatric/Behavioral: Negative for behavioral problems and confusion. The patient is not nervous/anxious.    All other systems reviewed and are negative.      Objective:      Vitals:    03/15/18 1032   Weight: 114.3 kg (252 lb)   Height: 5' 6" (1.676 m)     Physical Exam   Nursing note and vitals reviewed.  Constitutional: He is oriented to person, place, and time. He appears well-developed and well-nourished.   HENT:   Head: Normocephalic.   Eyes: Conjunctivae are normal.   Neck: Normal range of motion. Neck supple. No tracheal deviation present. No thyromegaly present.   Cardiovascular: Normal rate and normal heart sounds.    Pulmonary/Chest: " Effort normal and breath sounds normal. No respiratory distress. He has no wheezes.   Abdominal: Soft. Bowel sounds are normal. There is no hepatosplenomegaly. There is no tenderness. There is no rebound and no CVA tenderness. No hernia.   Musculoskeletal: Normal range of motion. He exhibits no edema or tenderness.   Lymphadenopathy:     He has no cervical adenopathy.   Neurological: He is alert and oriented to person, place, and time.   Skin: Skin is warm and dry. No rash noted. No erythema.     Psychiatric: He has a normal mood and affect. His behavior is normal. Judgment and thought content normal.       3/5/2018  PSA 1    Assessment:       1. BPH with obstruction/lower urinary tract symptoms    2. Nocturia more than twice per night    3. Incomplete emptying of bladder    4. BPH without urinary obstruction          Plan:       1. BPH with obstruction/lower urinary tract symptoms  Stable  JENNIFER next time    2. Nocturia more than twice per night  Limit evening fluids    3. Incomplete emptying of bladder   Flomax and bethanechol      4. BPH without urinary obstruction    - tamsulosin (FLOMAX) 0.4 mg Cp24; Take 1 capsule (0.4 mg total) by mouth once daily.  Dispense: 90 capsule; Refill: 3  - bethanechol (URECHOLINE) 25 MG Tab; Take 2 tablets (50 mg total) by mouth 3 (three) times daily.  Dispense: 180 tablet; Refill: 1            No Follow-up on file.

## 2018-03-26 ENCOUNTER — TELEPHONE (OUTPATIENT)
Dept: SLEEP MEDICINE | Facility: OTHER | Age: 73
End: 2018-03-26

## 2018-03-28 ENCOUNTER — TELEPHONE (OUTPATIENT)
Dept: SLEEP MEDICINE | Facility: OTHER | Age: 73
End: 2018-03-28

## 2018-05-08 DIAGNOSIS — Z13.6 SCREENING FOR AAA (AORTIC ABDOMINAL ANEURYSM): ICD-10-CM

## 2018-05-08 DIAGNOSIS — Z87.891 PERSONAL HISTORY OF TOBACCO USE, PRESENTING HAZARDS TO HEALTH: Primary | ICD-10-CM

## 2018-05-25 ENCOUNTER — TELEPHONE (OUTPATIENT)
Dept: NEPHROLOGY | Facility: CLINIC | Age: 73
End: 2018-05-25

## 2018-05-29 ENCOUNTER — OFFICE VISIT (OUTPATIENT)
Dept: SLEEP MEDICINE | Facility: CLINIC | Age: 73
End: 2018-05-29
Payer: MEDICARE

## 2018-05-29 VITALS
OXYGEN SATURATION: 89 % | WEIGHT: 253 LBS | HEIGHT: 66 IN | BODY MASS INDEX: 40.66 KG/M2 | HEART RATE: 81 BPM | DIASTOLIC BLOOD PRESSURE: 46 MMHG | SYSTOLIC BLOOD PRESSURE: 110 MMHG

## 2018-05-29 DIAGNOSIS — R06.09 DYSPNEA ON EXERTION: ICD-10-CM

## 2018-05-29 DIAGNOSIS — J98.6 DIAPHRAGMATIC PARESIS: ICD-10-CM

## 2018-05-29 DIAGNOSIS — G47.33 OSA (OBSTRUCTIVE SLEEP APNEA): Primary | ICD-10-CM

## 2018-05-29 DIAGNOSIS — J18.9 PNEUMONIA DUE TO INFECTIOUS ORGANISM, UNSPECIFIED LATERALITY, UNSPECIFIED PART OF LUNG: ICD-10-CM

## 2018-05-29 PROCEDURE — 99214 OFFICE O/P EST MOD 30 MIN: CPT | Mod: S$GLB,,, | Performed by: INTERNAL MEDICINE

## 2018-05-29 PROCEDURE — 99999 PR PBB SHADOW E&M-EST. PATIENT-LVL III: CPT | Mod: PBBFAC,,, | Performed by: INTERNAL MEDICINE

## 2018-05-29 NOTE — PROGRESS NOTES
Flaquito Jones  was seen as a follow up.    CHIEF COMPLAINT:  Sleep Apnea; Dyspnea on exertion; and Follow-up      HISTORY OF PRESENT ILLNESS: Flaquito Jones is a 72 y.o. male with pmh of tobacco abuse (40 pack years), djd, htn, dyslipidemia is here for pulmonary evaluation.  Our first encounter was 3/31/16.  Patient smoke 1 ppd x 40 years.  Quit 2015.  +chronic suh x 1/2 block.  Limited exertion due to back pain and balance issue.  No fever/chills.  No coughing.  Currently on albuterol and spiriva.  No chest pain.  No orthopnea.  Sleep on side due to back pain.  No pnd.  Chronically anticoagulate for dvt/pe.  +chronic lower extremities swelling.      +loud snoring.  No witnessed sleep apnea.  +frequent awakening for urination.  +fatigue upon awake daily.  Sleep a lot during the day.      S/p sleep study 5/16 with severe earline.  Titration was poor due to high leak.  Repeat titration showed improve ahi with bipap of 19/10.  BPAP was ordered.  Patient returned bipap after 1 day.   Patient s/p ct with eventration of right hemidiaphragm.  Patient s/p sniff test indicating right diaphragm paralysis.  On second attempt of bipap, patient returned bpap after 1 week.      During last appointment, patient was recommended retitration.  Patient declined due to high copay.      Patient recently hospitalized at [facility] for pneumonia.  While hospitalized, patient was placed on bipap.  Patient tolerated bipap well.  Patient was set up with bipap after hospitalization.  However, patient does not have mask interface.      PAST MEDICAL HISTORY:    Active Ambulatory Problems     Diagnosis Date Noted    Thoracic or lumbosacral neuritis or radiculitis, unspecified 11/27/2012    EARLINE (obstructive sleep apnea)     Chronic diastolic congestive heart failure 05/31/2016    Chest pain, atypical 05/31/2016    Essential hypertension 05/31/2016    HLD (hyperlipidemia) 05/31/2016    DVT (deep venous thrombosis) 05/31/2016     Resolved Ambulatory  Problems     Diagnosis Date Noted    No Resolved Ambulatory Problems     Past Medical History:   Diagnosis Date    High cholesterol     High triglycerides     Hypertension     Lymphedema     Pulmonary embolism 2014    S/P injection of intervertebral space for herniated disc                 PAST SURGICAL HISTORY:    Past Surgical History:   Procedure Laterality Date    BACK SURGERY      CERVICAL FUSION      2006         FAMILY HISTORY:                Family History   Problem Relation Age of Onset    Family history unknown: Yes       SOCIAL HISTORY:          Tobacco:   History   Smoking Status    Former Smoker    Packs/day: 1.00    Years: 40.00    Quit date: 3/31/2015   Smokeless Tobacco    Never Used     Comment: pt quit smoking year ago     alcohol use:    History   Alcohol Use No               Occupation:   on boat    ALLERGIES:  No Known Allergies    CURRENT MEDICATIONS:    Current Outpatient Prescriptions   Medication Sig Dispense Refill    allopurinol (ZYLOPRIM) 100 MG tablet TAKE 1 TABLET(S) BY MOUTH ONCE A DAY to prevent gout  6    bethanechol (URECHOLINE) 25 MG Tab Take 2 tablets (50 mg total) by mouth 3 (three) times daily. 180 tablet 1    cholecalciferol, vitamin D3, (VITAMIN D3) 1,000 unit capsule Take 1,000 Units by mouth once daily.      CRANBERRY FRUIT EXTRACT (CRANBERRY CONCENTRATE ORAL) Take by mouth.      docusate sodium (COLACE) 100 MG capsule Take 100 mg by mouth 2 (two) times daily.      furosemide (LASIX) 40 MG tablet Take 40 mg by mouth 2 (two) times daily.      gabapentin (NEURONTIN) 300 MG capsule Take 300 mg by mouth 3 (three) times daily.      hydrALAZINE (APRESOLINE) 25 MG tablet Take 25 mg by mouth 3 (three) times daily.      ipratropium (ATROVENT) 0.02 % nebulizer solution Take 500 mcg by nebulization 4 (four) times daily. Rescue      ranitidine (ZANTAC) 150 MG tablet Take 150 mg by mouth 2 (two) times daily.      rosuvastatin (CRESTOR) 20 MG tablet Take  "20 mg by mouth once daily.      tamsulosin (FLOMAX) 0.4 mg Cp24 Take 1 capsule (0.4 mg total) by mouth once daily. 90 capsule 3    tiotropium (SPIRIVA) 18 mcg inhalation capsule Inhale 18 mcg into the lungs once daily. Controller      warfarin (COUMADIN) 5 MG tablet   3     No current facility-administered medications for this visit.                   REVIEW OF SYSTEMS:   No acute changes from previous encounter dated 3/31/2016 with exceptions mentioned in HPI.             PHYSICAL EXAM:  Vitals:    05/29/18 0946   BP: (!) 110/46   Pulse: 81   SpO2: (!) 89%   Weight: 114.8 kg (253 lb)   Height: 5' 6" (1.676 m)   PainSc: 0-No pain     Body mass index is 40.84 kg/m².     GENERAL:  well develop; no apparent distress  HEENT:  no nasal congestion; no discharge noted; class 4 modified mallampatti.  Edentulous.   NECK:  supple; no palpable masses.  CARDIO: regular rate and rhythm  PULM:  clear to auscultation bilaterally; no intercostals retractions; no accessory muscle usage   ABDOMEN:  soft nontender/nondistended.  +bowel sound  EXTREMITIES no cc; +lower extremities edema  NEURO:  CN II-XII intact.  5/5 motor in all extremities.  sensation grossly intact   to light touch.  PSYCH:  normal affect.  Alert and oriented x 4    LABS  Pulmonary Functions Testing Results: 4/4/16 poor effort; ratio 59%; fvc 50%; fev1 44%  ABG none  CXR:  3/31/16 basilar atelectasis  CT CHEST:  - no honeycombing.  No effusion or consolidation  Echo 4/4/16 ef 45%; concentric remodelling    Split 5/13/16 ahi of 114 with min sat of 74%; optimal cpap was not achieved.    Titration 8/8/16 optimal bipap of 19/10  Ct 4/29/16 elevation of right hemidiaphragm  Sniff +limited excursion of right hemidiaphragm  ASSESSMENT    ICD-10-CM ICD-9-CM    1. DIVYA (obstructive sleep apnea) G47.33 327.23 CPAP/BIPAP SUPPLIES   2. Dyspnea on exertion R06.09 786.09    3. 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 Pneumonia due to infectious organism, unspecified laterality, " unspecified part of lung J18.9 136.9      484.8        PLAN:  Dyspnea - chronic and most likely multifactorial.  HFrEF + habitus + diastolic dysfunction + decondition.  Echo suggestive of diastolic dysfunction, reduce ef with elevated bnp.  pft with restrictive physiololgy.  CT without significant parenchymal lung disease.  +right hemidiaphragm paresis.      chf - mildly decreased ef with evidence of diastolic dysfunction.  Follow by Dr. Zee    earline -  Severe earline.  D/w patient at length.  Need treatment due to severity.  Patient returned bipap after 1 day.  Second trial lasted 1 week. Patient is currently with late mother-in-law cpap of 8 and tolerating it better.  New bipap from hospital is at home.  Will write prescription for new supply.    Diaphragm paresis - documented on sniff.  Difficulty with supine position.  More comfortable on right side.  Tolerated bipap while in hospital.      Copd - currently on spiriva and albuterol.      Obesity - aware of need for drastic weight loss.  Difficult with exercise due djd and dyspnea    Pneumonia - per patient, hospitalized at [facility] for pneumonia.  Will order cxr for next visit.      dispostion - recommend patient to form living.        Patient will Follow-up in about 3 months (around 8/29/2018).

## 2018-06-05 ENCOUNTER — TELEPHONE (OUTPATIENT)
Dept: HEMATOLOGY/ONCOLOGY | Facility: CLINIC | Age: 73
End: 2018-06-05

## 2018-06-05 NOTE — TELEPHONE ENCOUNTER
----- Message from Nadeen Michael sent at 6/5/2018  9:46 AM CDT -----  Contact: Pt        [phone]  Needs Advice    Reason for call:  Pt has not received his mask for his C Pap machine       Communication Preference:  Call the pt back by phone to verify   Additional Information:

## 2018-06-12 ENCOUNTER — TELEPHONE (OUTPATIENT)
Dept: SLEEP MEDICINE | Facility: OTHER | Age: 73
End: 2018-06-12

## 2018-06-12 ENCOUNTER — TELEPHONE (OUTPATIENT)
Dept: PULMONOLOGY | Facility: CLINIC | Age: 73
End: 2018-06-12

## 2018-06-12 DIAGNOSIS — G47.33 OSA (OBSTRUCTIVE SLEEP APNEA): Primary | ICD-10-CM

## 2018-06-12 NOTE — TELEPHONE ENCOUNTER
I left a voicemail to let the pt know will need repeat sleep study in order to get new unit.  An order was written per Dr Fischer, patient should expect a call from sleep lab in 7-10 working days. I left the office number, if the pt has any question's,

## 2018-06-12 NOTE — TELEPHONE ENCOUNTER
----- Message from Rhoda Allen sent at 6/12/2018  8:19 AM CDT -----  Contact: Self [phone]  Good Morning I spoke with mr. blum and advised him to keep the machine from [facility] and switch to our company for supplies.  He turned his machine in against medical advice, so he would have to go through another sleep study in order for us to give him another machine.  ----- Message -----  From: Holland Fischer MD  Sent: 6/11/2018   5:19 PM  To: Rhoda Duong,  Does he need another sleep study before we can get him a bipap machine?  [place]  ----- Message -----  From: Kaitlyn Willson MA  Sent: 6/4/2018  12:59 PM  To: Holland Fischer MD        ----- Message -----  From: Maribeth Still  Sent: 6/4/2018  11:39 AM  To: Aaliyah Luther    PT states he received his CPAP machine from [facility] but he will return it tomorrow because they are not able to supply him with the supplies. He wants Dr. Fischer to send an order to another company.     He is requesting a call with an update.

## 2018-06-12 NOTE — TELEPHONE ENCOUNTER
Please inform patient that we have to order repeat sleep study in order to get new unit.  An order was written, patient should expect a call from sleep lab in 7-10 working days.

## 2018-06-19 ENCOUNTER — TELEPHONE (OUTPATIENT)
Dept: PULMONOLOGY | Facility: CLINIC | Age: 73
End: 2018-06-19

## 2018-06-19 NOTE — TELEPHONE ENCOUNTER
Patient states he got a new c-pap when d/c'd from hospital and does not need to repeat the sleep study. He asks when is he due for follow up.

## 2018-06-19 NOTE — TELEPHONE ENCOUNTER
----- Message from Vika Dumont sent at 6/19/2018 10:19 AM CDT -----  Contact: Self  Pt is calling to speak with nurse in regards to sleep study test and c pap machine  Pt can be reached at [phone]

## 2018-06-21 ENCOUNTER — TELEPHONE (OUTPATIENT)
Dept: SLEEP MEDICINE | Facility: OTHER | Age: 73
End: 2018-06-21

## 2018-07-03 ENCOUNTER — OFFICE VISIT (OUTPATIENT)
Dept: OPTOMETRY | Facility: CLINIC | Age: 73
End: 2018-07-03
Payer: MEDICARE

## 2018-07-03 DIAGNOSIS — I10 ESSENTIAL HYPERTENSION: ICD-10-CM

## 2018-07-03 DIAGNOSIS — D17.39 DERMOLIPOMA OF ORBIT: ICD-10-CM

## 2018-07-03 DIAGNOSIS — H04.123 DRY EYE SYNDROME, BILATERAL: ICD-10-CM

## 2018-07-03 DIAGNOSIS — H25.13 NUCLEAR SCLEROSIS OF BOTH EYES: Primary | ICD-10-CM

## 2018-07-03 DIAGNOSIS — H52.7 REFRACTIVE ERROR: ICD-10-CM

## 2018-07-03 PROCEDURE — 92004 COMPRE OPH EXAM NEW PT 1/>: CPT | Mod: S$GLB,,, | Performed by: OPTOMETRIST

## 2018-07-03 PROCEDURE — 99999 PR PBB SHADOW E&M-EST. PATIENT-LVL II: CPT | Mod: PBBFAC,,, | Performed by: OPTOMETRIST

## 2018-07-03 PROCEDURE — 92015 DETERMINE REFRACTIVE STATE: CPT | Mod: S$GLB,,, | Performed by: OPTOMETRIST

## 2018-07-03 RX ORDER — IPRATROPIUM BROMIDE AND ALBUTEROL SULFATE 2.5; .5 MG/3ML; MG/3ML
SOLUTION RESPIRATORY (INHALATION)
Refills: 3 | COMMUNITY
Start: 2018-06-06

## 2018-07-03 NOTE — PROGRESS NOTES
Subjective:       Patient ID: Flaquito Jones is a 72 y.o. male      Chief Complaint   Patient presents with    Concerns About Ocular Health    Hypertensive Eye Exam     History of Present Illness  Dls: 13 yrs ago     73 y/o male presents to for hypertensive eye exam.   Pt c/o blurry vision at near ou. Pt wears otc readers.   Pt c/o dry eyes ou no tearing no itching no burning no pain no ha's no floaters.   Pt wife notice bump under JORGE.     Eye meds:  systane ou prn        Assessment/Plan:     1. Nuclear sclerosis of both eyes  NVS per pt. Educated pt on presence of cataracts and effects on vision. No surgery at this time. Recheck in one year.    2. Essential hypertension  Vessel changes. Continue BP control and follow up care with PCP. Monitor yearly with DFE.     3. Dermolipoma of orbit  Discussed with pt. Declines surgical intervention at this time.    4. Dry eye syndrome, bilateral  Continue systane PRN    5.Refractive error  Educated patient on refractive error and discussed lens options. Dispensed updated spectacle Rx. Educated about adaptation period to new specs.    Eyeglass Final Rx     Eyeglass Final Rx       Sphere Cylinder Axis Add    Right [place] +0.75 170 +2.50    Left -0.75 Sphere  +2.50    Expiration Date:  7/4/2019                  Follow-up in about 1 year (around 7/3/2019) for Cataract check.

## 2018-07-18 ENCOUNTER — TELEPHONE (OUTPATIENT)
Dept: SLEEP MEDICINE | Facility: CLINIC | Age: 73
End: 2018-07-18

## 2018-08-21 ENCOUNTER — OFFICE VISIT (OUTPATIENT)
Dept: SLEEP MEDICINE | Facility: CLINIC | Age: 73
End: 2018-08-21
Payer: MEDICARE

## 2018-08-21 VITALS
HEART RATE: 85 BPM | HEIGHT: 66 IN | OXYGEN SATURATION: 90 % | BODY MASS INDEX: 40.66 KG/M2 | WEIGHT: 253 LBS | DIASTOLIC BLOOD PRESSURE: 58 MMHG | SYSTOLIC BLOOD PRESSURE: 114 MMHG

## 2018-08-21 DIAGNOSIS — J98.6 DIAPHRAGM PARALYSIS: ICD-10-CM

## 2018-08-21 DIAGNOSIS — E66.9 OBESITY, UNSPECIFIED CLASSIFICATION, UNSPECIFIED OBESITY TYPE, UNSPECIFIED WHETHER SERIOUS COMORBIDITY PRESENT: ICD-10-CM

## 2018-08-21 DIAGNOSIS — G47.33 OSA (OBSTRUCTIVE SLEEP APNEA): Primary | ICD-10-CM

## 2018-08-21 PROCEDURE — 99999 PR PBB SHADOW E&M-EST. PATIENT-LVL III: CPT | Mod: PBBFAC,,, | Performed by: INTERNAL MEDICINE

## 2018-08-21 PROCEDURE — 99214 OFFICE O/P EST MOD 30 MIN: CPT | Mod: S$GLB,,, | Performed by: INTERNAL MEDICINE

## 2018-08-21 RX ORDER — ALBUTEROL SULFATE 90 UG/1
2 AEROSOL, METERED RESPIRATORY (INHALATION) 4 TIMES DAILY
Refills: 3 | COMMUNITY
Start: 2018-07-28

## 2018-08-21 RX ORDER — APIXABAN 5 MG/1
5 TABLET, FILM COATED ORAL 2 TIMES DAILY
Refills: 5 | COMMUNITY
Start: 2018-08-10

## 2018-08-21 NOTE — PROGRESS NOTES
Flaquito Jones  was seen as a follow up.    CHIEF COMPLAINT:  Follow-up      HISTORY OF PRESENT ILLNESS: Flaquito Jones is a 73 y.o. male with pmh of tobacco abuse (40 pack years), djd, htn, dyslipidemia is here for pulmonary evaluation.  Our first encounter was 3/31/16.  Patient smoke 1 ppd x 40 years.  Quit 2015.  +chronic suh x 1/2 block.  Limited exertion due to back pain and balance issue.  No fever/chills.  No coughing.  Currently on albuterol and spiriva.  No chest pain.  No orthopnea.  Sleep on side due to back pain.  No pnd.  Chronically anticoagulate for dvt/pe.  +chronic lower extremities swelling.      +loud snoring.  No witnessed sleep apnea.  +frequent awakening for urination.  +fatigue upon awake daily.  Sleep a lot during the day.      S/p sleep study 5/16 with severe earline.  Titration was poor due to high leak.  Repeat titration showed improve ahi with bipap of 19/10.  BPAP was ordered.  Patient returned bipap after 1 day.   Patient s/p ct with eventration of right hemidiaphragm.  Patient s/p sniff test indicating right diaphragm paralysis.  On second attempt of bipap, patient returned bpap after 1 week.      Patient was recommended retitration.  Patient declined due to high copay.      Patient recently hospitalized at [facility] for pneumonia.  Upon discharge patient was sent home with apap.  Patient is here with apap today.  Poor compliance.  Main issue is dry mouth.  With machine, patient denied snoring.  Often pulled mask off due to dry mouth.      PAST MEDICAL HISTORY:    Active Ambulatory Problems     Diagnosis Date Noted    Thoracic or lumbosacral neuritis or radiculitis, unspecified 11/27/2012    EARLINE (obstructive sleep apnea)     Chronic diastolic congestive heart failure 05/31/2016    Chest pain, atypical 05/31/2016    Essential hypertension 05/31/2016    HLD (hyperlipidemia) 05/31/2016    DVT (deep venous thrombosis) 05/31/2016     Resolved Ambulatory Problems     Diagnosis Date Noted     No Resolved Ambulatory Problems     Past Medical History:   Diagnosis Date    High cholesterol     High triglycerides     Hypertension     Lymphedema     Pulmonary embolism 2014    S/P injection of intervertebral space for herniated disc                 PAST SURGICAL HISTORY:    Past Surgical History:   Procedure Laterality Date    BACK SURGERY      CERVICAL FUSION      2006         FAMILY HISTORY:                Family History   Problem Relation Age of Onset    No Known Problems Father     No Known Problems Mother     No Known Problems Sister     No Known Problems Brother     No Known Problems Maternal Aunt     No Known Problems Maternal Uncle     No Known Problems Paternal Aunt     No Known Problems Paternal Uncle     No Known Problems Maternal Grandmother     No Known Problems Maternal Grandfather     No Known Problems Paternal Grandmother     No Known Problems Paternal Grandfather     Amblyopia Neg Hx     Blindness Neg Hx     Cancer Neg Hx     Cataracts Neg Hx     Diabetes Neg Hx     Glaucoma Neg Hx     Hypertension Neg Hx     Macular degeneration Neg Hx     Retinal detachment Neg Hx     Strabismus Neg Hx     Stroke Neg Hx     Thyroid disease Neg Hx        SOCIAL HISTORY:          Tobacco:   Social History     Tobacco Use   Smoking Status Former Smoker    Packs/day: 1.00    Years: 40.00    Pack years: 40.00    Last attempt to quit: 3/31/2015    Years since quitting: 3.3   Smokeless Tobacco Never Used   Tobacco Comment    pt quit smoking year ago     alcohol use:    Social History     Substance and Sexual Activity   Alcohol Use No               Occupation:   on boat    ALLERGIES:  No Known Allergies    CURRENT MEDICATIONS:    Current Outpatient Medications   Medication Sig Dispense Refill    albuterol-ipratropium (DUO-NEB) 2.5 mg-0.5 mg/3 mL nebulizer solution NEBULIZE 1 vial EVERY 6 HOURS AS NEEDED  3    allopurinol (ZYLOPRIM) 100 MG tablet TAKE 1 TABLET(S) BY MOUTH  "ONCE A DAY to prevent gout  6    bethanechol (URECHOLINE) 25 MG Tab Take 2 tablets (50 mg total) by mouth 3 (three) times daily. 180 tablet 1    cholecalciferol, vitamin D3, (VITAMIN D3) 1,000 unit capsule Take 1,000 Units by mouth once daily.      CRANBERRY FRUIT EXTRACT (CRANBERRY CONCENTRATE ORAL) Take by mouth.      docusate sodium (COLACE) 100 MG capsule Take 100 mg by mouth 2 (two) times daily.      ELIQUIS 5 mg Tab Take 5 mg by mouth 2 (two) times daily.  5    furosemide (LASIX) 40 MG tablet Take 40 mg by mouth 2 (two) times daily.      gabapentin (NEURONTIN) 300 MG capsule Take 300 mg by mouth 3 (three) times daily.      hydrALAZINE (APRESOLINE) 25 MG tablet Take 25 mg by mouth 3 (three) times daily.      ipratropium (ATROVENT) 0.02 % nebulizer solution Take 500 mcg by nebulization 4 (four) times daily. Rescue      ranitidine (ZANTAC) 150 MG tablet Take 150 mg by mouth 2 (two) times daily.      rosuvastatin (CRESTOR) 20 MG tablet Take 20 mg by mouth once daily.      tamsulosin (FLOMAX) 0.4 mg Cp24 Take 1 capsule (0.4 mg total) by mouth once daily. 90 capsule 3    tiotropium (SPIRIVA) 18 mcg inhalation capsule Inhale 18 mcg into the lungs once daily. Controller      VENTOLIN HFA 90 mcg/actuation inhaler Inhale 2 puffs into the lungs 4 (four) times daily.  3    warfarin (COUMADIN) 5 MG tablet   3     No current facility-administered medications for this visit.                   REVIEW OF SYSTEMS:   No acute changes from previous encounter dated 3/31/2016 with exceptions mentioned in HPI.             PHYSICAL EXAM:  Vitals:    08/21/18 0853   BP: (!) 114/58   Pulse: 85   SpO2: (!) 90%   Weight: 114.8 kg (253 lb)   Height: 5' 6" (1.676 m)   PainSc: 0-No pain     Body mass index is 40.84 kg/m².     GENERAL:  well develop; no apparent distress  HEENT:  no nasal congestion; no discharge noted; class 4 modified mallampatti.  Edentulous.   NECK:  supple; no palpable masses.  CARDIO: regular rate and " rhythm  PULM:  clear to auscultation bilaterally; no intercostals retractions; no accessory muscle usage   ABDOMEN:  soft nontender/nondistended.  +bowel sound  EXTREMITIES no cc; +lower extremities edema  NEURO:  CN II-XII intact.  5/5 motor in all extremities.  sensation grossly intact   to light touch.  PSYCH:  normal affect.  Alert and oriented x 4    LABS  Pulmonary Functions Testing Results: 4/4/16 poor effort; ratio 59%; fvc 50%; fev1 44%  ABG none  CXR:  3/31/16 basilar atelectasis  CT CHEST:  - no honeycombing.  No effusion or consolidation  Echo 4/4/16 ef 45%; concentric remodelling    Split 5/13/16 ahi of 114 with min sat of 74%; optimal cpap was not achieved.    Titration 8/8/16 optimal bipap of 19/10  Ct 4/29/16 elevation of right hemidiaphragm  Sniff +limited excursion of right hemidiaphragm  ASSESSMENT    ICD-10-CM ICD-9-CM    1. DIVYA (obstructive sleep apnea) G47.33 327.23 CPAP/BIPAP SUPPLIES   2. 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 Obesity, unspecified classification, unspecified obesity type, unspecified whether serious comorbidity present E66.9 278.00        PLAN:  Dyspnea - chronic and most likely multifactorial.  HFrEF + habitus + diastolic dysfunction + decondition.  Echo suggestive of diastolic dysfunction, reduce ef with elevated bnp.  pft with restrictive physiololgy.  CT without significant parenchymal lung disease.  +right hemidiaphragm paresis.      chf - mildly decreased ef with evidence of diastolic dysfunction.  Follow by Dr. Zee    divya -  Severe divya.  Interrogation of apap confirmed pressure of 5-20 cm H20.  Poor compliance due to dry mouth.  Advice chin strap.  Will switch to nasal mask.  Desensitization protocol d/w patient.      Diaphragm paresis - documented on sniff.  Difficulty with supine position.  More comfortable on right side.  Not candidate for plication.    Copd - currently on spiriva and albuterol.  Wife will try to get assistance for meds thru pharmaceutical  company.      Obesity - aware of need for drastic weight loss.  Difficult with exercise due djd and dyspnea      dispostion - recommend patient to form living.        Patient will Follow-up in about 3 months (around 11/21/2018). md/np

## 2018-08-21 NOTE — PATIENT INSTRUCTIONS
Step 1:  Wear the CPAP mask at home while awake with chin strap for one hour each day. Practice breathing through the mask while watching television, reading, or performing another sedentary activity that keeps your mind off your anxiety.     Step 2: Use the CPAP mask  during scheduled one hour naps at home.     Step 3: Use CPAP mask  during the initial 3-4 hours of nocturnal sleep.     Step 4: Use CPAP mask  through the entire night of sleep.

## 2018-09-11 ENCOUNTER — TELEPHONE (OUTPATIENT)
Dept: SLEEP MEDICINE | Facility: CLINIC | Age: 73
End: 2018-09-11

## 2018-09-11 NOTE — TELEPHONE ENCOUNTER
I spoken with Ms Blum  Pt wife. She stated that she called the [facility] Not the [facility]! Pt stated  That she spoken with some one at the [facility] and she does not know the name of the person she spoke with. pt wife wanted to know could she get a full face mask for her  because her insurance company told her she could. Pt was very upset when I spoken with her. I was able to get Ms blum to clam down so she can tell me what she needs.. I called the [facility] from a different phone to find out what was all needed to be done I spoke with eveline and she stated that the pt would need to bring the nasal mask back and exchange for a full face.Eveline also stated that someone from the DME told her that and she got upset and hung up the phone. Once I inform pt wife on what she needed to do she stated that she will keep the nasal mask for her . She was also inform that if she wants both masks she would have to come out of pocket for one of them because her insurance will not pay for two different mask coming from  Two different DME company.

## 2018-09-20 ENCOUNTER — OFFICE VISIT (OUTPATIENT)
Dept: UROLOGY | Facility: CLINIC | Age: 73
End: 2018-09-20
Payer: MEDICARE

## 2018-09-20 VITALS
DIASTOLIC BLOOD PRESSURE: 70 MMHG | HEART RATE: 68 BPM | HEIGHT: 66 IN | BODY MASS INDEX: 39.37 KG/M2 | SYSTOLIC BLOOD PRESSURE: 122 MMHG | WEIGHT: 245 LBS

## 2018-09-20 DIAGNOSIS — N13.8 BPH WITH OBSTRUCTION/LOWER URINARY TRACT SYMPTOMS: Primary | ICD-10-CM

## 2018-09-20 DIAGNOSIS — R35.1 NOCTURIA MORE THAN TWICE PER NIGHT: ICD-10-CM

## 2018-09-20 DIAGNOSIS — R35.0 URINARY FREQUENCY: ICD-10-CM

## 2018-09-20 DIAGNOSIS — N40.1 BPH WITH OBSTRUCTION/LOWER URINARY TRACT SYMPTOMS: Primary | ICD-10-CM

## 2018-09-20 PROCEDURE — 99213 OFFICE O/P EST LOW 20 MIN: CPT | Mod: PBBFAC | Performed by: UROLOGY

## 2018-09-20 PROCEDURE — 99214 OFFICE O/P EST MOD 30 MIN: CPT | Mod: S$PBB,,, | Performed by: UROLOGY

## 2018-09-20 PROCEDURE — 81001 URINALYSIS AUTO W/SCOPE: CPT | Mod: PBBFAC | Performed by: UROLOGY

## 2018-09-20 PROCEDURE — 99999 PR PBB SHADOW E&M-EST. PATIENT-LVL III: CPT | Mod: PBBFAC,,, | Performed by: UROLOGY

## 2018-09-20 RX ORDER — PRIMIDONE 50 MG/1
TABLET ORAL
COMMUNITY

## 2018-09-20 RX ORDER — BETHANECHOL CHLORIDE 25 MG/1
50 TABLET ORAL 3 TIMES DAILY
Qty: 180 TABLET | Refills: 1 | Status: SHIPPED | OUTPATIENT
Start: 2018-09-20 | End: 2019-04-16 | Stop reason: SDUPTHER

## 2018-09-20 RX ORDER — TAMSULOSIN HYDROCHLORIDE 0.4 MG/1
0.4 CAPSULE ORAL DAILY
Qty: 90 CAPSULE | Refills: 3 | Status: SHIPPED | OUTPATIENT
Start: 2018-09-20 | End: 2019-04-16 | Stop reason: SDUPTHER

## 2018-09-20 NOTE — PROGRESS NOTES
Subjective:       Patient ID: Flaquito Jones is a 73 y.o. male who was last seen in this office Visit date not found    Chief Complaint:   Chief Complaint   Patient presents with    Benign Prostatic Hypertrophy     6 month f/u        History of Present Illness  Benign Prostatic Hypertrophy  Patient complains of lower urinary tract symptoms. He reports frequency, incomplete emptying and nocturia two times a night. He denies straining and urgency. Patient states symptoms are of mild severity. Onset of symptoms was several years ago and was gradual in onset. He has no personal history and no family history of prostate cancer. He reports a history of no complicating symptoms. He denies flank pain, gross hematuria, kidney stones and recurrent UTI.  He does well with Flomax and Bethanechol.      ACTIVE MEDICAL ISSUES:  Patient Active Problem List   Diagnosis    Thoracic or lumbosacral neuritis or radiculitis, unspecified    DIVYA (obstructive sleep apnea)    Chronic diastolic congestive heart failure    Chest pain, atypical    Essential hypertension    HLD (hyperlipidemia)    DVT (deep venous thrombosis)       ALLERGIES AND MEDICATIONS: updated and reviewed.  Review of patient's allergies indicates:  No Known Allergies  Current Outpatient Medications   Medication Sig    albuterol-ipratropium (DUO-NEB) 2.5 mg-0.5 mg/3 mL nebulizer solution NEBULIZE 1 vial EVERY 6 HOURS AS NEEDED    allopurinol (ZYLOPRIM) 100 MG tablet TAKE 1 TABLET(S) BY MOUTH ONCE A DAY to prevent gout    bethanechol (URECHOLINE) 25 MG Tab Take 2 tablets (50 mg total) by mouth 3 (three) times daily.    cholecalciferol, vitamin D3, (VITAMIN D3) 1,000 unit capsule Take 1,000 Units by mouth once daily.    CRANBERRY FRUIT EXTRACT (CRANBERRY CONCENTRATE ORAL) Take by mouth.    docusate sodium (COLACE) 100 MG capsule Take 100 mg by mouth 2 (two) times daily.    ELIQUIS 5 mg Tab Take 5 mg by mouth 2 (two) times daily.    furosemide (LASIX) 40  "MG tablet Take 40 mg by mouth 2 (two) times daily.    gabapentin (NEURONTIN) 300 MG capsule Take 300 mg by mouth 3 (three) times daily.    hydrALAZINE (APRESOLINE) 25 MG tablet Take 25 mg by mouth 3 (three) times daily.    ipratropium (ATROVENT) 0.02 % nebulizer solution Take 500 mcg by nebulization 4 (four) times daily. Rescue    primidone (MYSOLINE) 50 MG Tab Take by mouth.    ranitidine (ZANTAC) 150 MG tablet Take 150 mg by mouth 2 (two) times daily.    rosuvastatin (CRESTOR) 20 MG tablet Take 20 mg by mouth once daily.    tamsulosin (FLOMAX) 0.4 mg Cap Take 1 capsule (0.4 mg total) by mouth once daily.    tiotropium (SPIRIVA) 18 mcg inhalation capsule Inhale 18 mcg into the lungs once daily. Controller    VENTOLIN HFA 90 mcg/actuation inhaler Inhale 2 puffs into the lungs 4 (four) times daily.    warfarin (COUMADIN) 5 MG tablet      No current facility-administered medications for this visit.        Review of Systems   Constitutional: Negative for activity change, fatigue, fever and unexpected weight change.   HENT: Negative for congestion.    Eyes: Negative for redness.   Respiratory: Negative for chest tightness and shortness of breath.    Cardiovascular: Negative for chest pain and leg swelling.   Gastrointestinal: Negative for abdominal pain, constipation, diarrhea, nausea and vomiting.   Genitourinary: Negative for dysuria, flank pain, frequency, hematuria, penile pain, penile swelling, scrotal swelling, testicular pain and urgency.   Musculoskeletal: Negative for arthralgias and back pain.   Neurological: Negative for dizziness and light-headedness.   Psychiatric/Behavioral: Negative for behavioral problems and confusion. The patient is not nervous/anxious.    All other systems reviewed and are negative.      Objective:      Vitals:    09/20/18 0956   BP: 122/70   Pulse: 68   Weight: 111.1 kg (245 lb)   Height: 5' 6" (1.676 m)     Physical Exam   Nursing note and vitals reviewed.  Constitutional: " He is oriented to person, place, and time. He appears well-developed and well-nourished.   HENT:   Head: Normocephalic.   Eyes: Conjunctivae are normal.   Neck: Normal range of motion. No tracheal deviation present. No thyromegaly present.   Cardiovascular: Normal rate and normal heart sounds.    Pulmonary/Chest: Effort normal and breath sounds normal. No respiratory distress. He has no wheezes.   Abdominal: Soft. He exhibits no distension and no mass. There is no hepatosplenomegaly. There is no tenderness. There is no rebound, no guarding and no CVA tenderness. No hernia. Hernia confirmed negative in the right inguinal area and confirmed negative in the left inguinal area.   Genitourinary: Rectum normal, testes normal and penis normal. Rectal exam shows no external hemorrhoid, no mass and no tenderness. Prostate is enlarged. Prostate is not tender. Right testis shows no mass and no tenderness. Left testis shows no mass and no tenderness.       Musculoskeletal: He exhibits no edema or tenderness.   Lymphadenopathy: No inguinal adenopathy noted on the right or left side.   Neurological: He is alert and oriented to person, place, and time.   Skin: Skin is warm and dry. No rash noted. No erythema.     Psychiatric: He has a normal mood and affect. His behavior is normal. Judgment and thought content normal.       Urine dipstick shows negative for all components.  Micro exam: negative for WBC's or RBC's.    Assessment:       1. BPH with obstruction/lower urinary tract symptoms    2. Nocturia more than twice per night    3. Urinary frequency          Plan:       1. BPH with obstruction/lower urinary tract symptoms    - tamsulosin (FLOMAX) 0.4 mg Cap; Take 1 capsule (0.4 mg total) by mouth once daily.  Dispense: 90 capsule; Refill: 3  - bethanechol (URECHOLINE) 25 MG Tab; Take 2 tablets (50 mg total) by mouth 3 (three) times daily.  Dispense: 180 tablet; Refill: 1  - POCT urinalysis, dipstick or tablet reag  - Prostate  Specific Antigen, Diagnostic; Future    2. Nocturia more than twice per night  Limit evening fluids    3. Urinary frequency  As above            No Follow-up on file.

## 2018-10-08 ENCOUNTER — TELEPHONE (OUTPATIENT)
Dept: PULMONOLOGY | Facility: CLINIC | Age: 73
End: 2018-10-08

## 2018-10-08 NOTE — TELEPHONE ENCOUNTER
----- Message from George Figueredo sent at 10/8/2018  1:16 PM CDT -----  Contact: Rekha/Mary Da Silva is requesting progressive notes for pt's CPAP usage. Please contact her at [phone].    Thanks

## 2018-12-06 ENCOUNTER — OFFICE VISIT (OUTPATIENT)
Dept: PULMONOLOGY | Facility: CLINIC | Age: 73
End: 2018-12-06
Payer: MEDICARE

## 2018-12-06 VITALS
SYSTOLIC BLOOD PRESSURE: 111 MMHG | DIASTOLIC BLOOD PRESSURE: 64 MMHG | WEIGHT: 238 LBS | HEIGHT: 66 IN | OXYGEN SATURATION: 88 % | HEART RATE: 75 BPM | BODY MASS INDEX: 38.25 KG/M2

## 2018-12-06 DIAGNOSIS — J44.9 CHRONIC OBSTRUCTIVE PULMONARY DISEASE, UNSPECIFIED COPD TYPE: ICD-10-CM

## 2018-12-06 DIAGNOSIS — R06.09 DYSPNEA ON EXERTION: ICD-10-CM

## 2018-12-06 DIAGNOSIS — G47.33 OSA (OBSTRUCTIVE SLEEP APNEA): Primary | ICD-10-CM

## 2018-12-06 DIAGNOSIS — J98.6 DIAPHRAGM PARALYSIS: ICD-10-CM

## 2018-12-06 PROCEDURE — 99214 OFFICE O/P EST MOD 30 MIN: CPT | Mod: S$GLB,,, | Performed by: INTERNAL MEDICINE

## 2018-12-06 RX ORDER — FLUTICASONE PROPIONATE AND SALMETEROL 500; 50 UG/1; UG/1
1 POWDER RESPIRATORY (INHALATION) 2 TIMES DAILY
COMMUNITY

## 2018-12-06 NOTE — PROGRESS NOTES
Flaquito Jones  was seen as a follow up.    CHIEF COMPLAINT:  Sleep Apnea (re-evaluate for CPAP)      HISTORY OF PRESENT ILLNESS: Flaquito Jones is a 73 y.o. male with pmh of tobacco abuse (40 pack years), djd, htn, dyslipidemia is here for pulmonary evaluation.  Our first encounter was 3/31/16.  Patient smoke 1 ppd x 40 years.  Quit 2015.  +chronic suh x 1/2 block.  Limited exertion due to back pain and balance issue.  No fever/chills.  No coughing.  Currently on albuterol and spiriva.  No chest pain.  No orthopnea.  Sleep on side due to back pain.  No pnd.  Chronically anticoagulate for dvt/pe.  +chronic lower extremities swelling.      +loud snoring.  No witnessed sleep apnea.  +frequent awakening for urination.  +fatigue upon awake daily.  Sleep a lot during the day.      S/p sleep study 5/16 with severe earline.  Titration was poor due to high leak.  Repeat titration showed improve ahi with bipap of 19/10.  BPAP was ordered.  Patient returned bipap after 1 day.   Patient s/p ct with eventration of right hemidiaphragm.  Patient s/p sniff test indicating right diaphragm paralysis.  On second attempt of bipap, patient returned bpap after 1 week.      Patient was recommended retitration.  Patient declined due to high copay.      Patient recently hospitalized at [facility] for pneumonia.  Upon discharge patient was sent home with apap.  Patient is here with apap today.  Poor compliance.  Main issue is dry mouth.  With machine, patient denied snoring.  Often pulled mask off due to dry mouth.  During last clinic, patient was switched to nasal mask.      Since last clinic, cpap machine was taken back due to failed compliance.  Per wife, main issue is lack of motivation.  Patient declined another trial of pap.      PAST MEDICAL HISTORY:    Active Ambulatory Problems     Diagnosis Date Noted    Thoracic or lumbosacral neuritis or radiculitis, unspecified 11/27/2012    EARLINE (obstructive sleep apnea)     Chronic diastolic  congestive heart failure 05/31/2016    Chest pain, atypical 05/31/2016    Essential hypertension 05/31/2016    HLD (hyperlipidemia) 05/31/2016    DVT (deep venous thrombosis) 05/31/2016    Diaphragm paralysis 12/06/2018    COPD (chronic obstructive pulmonary disease) 12/06/2018    Dyspnea on exertion 12/06/2018     Resolved Ambulatory Problems     Diagnosis Date Noted    No Resolved Ambulatory Problems     Past Medical History:   Diagnosis Date    High cholesterol     High triglycerides     Hypertension     Lymphedema     Pulmonary embolism 2014    S/P injection of intervertebral space for herniated disc                 PAST SURGICAL HISTORY:    Past Surgical History:   Procedure Laterality Date    BACK SURGERY      CERVICAL FUSION      2006    FUSION, SPINE, LUMBAR, TLIF, WITH PERCUTANEOUS INSTRUMENTATION N/A 11/27/2012    Performed by Ronald Mejia MD at [facility] OR         FAMILY HISTORY:                Family History   Problem Relation Age of Onset    No Known Problems Father     No Known Problems Mother     No Known Problems Sister     No Known Problems Brother     No Known Problems Maternal Aunt     No Known Problems Maternal Uncle     No Known Problems Paternal Aunt     No Known Problems Paternal Uncle     No Known Problems Maternal Grandmother     No Known Problems Maternal Grandfather     No Known Problems Paternal Grandmother     No Known Problems Paternal Grandfather     Amblyopia Neg Hx     Blindness Neg Hx     Cancer Neg Hx     Cataracts Neg Hx     Diabetes Neg Hx     Glaucoma Neg Hx     Hypertension Neg Hx     Macular degeneration Neg Hx     Retinal detachment Neg Hx     Strabismus Neg Hx     Stroke Neg Hx     Thyroid disease Neg Hx        SOCIAL HISTORY:          Tobacco:   Social History     Tobacco Use   Smoking Status Former Smoker    Packs/day: 1.00    Years: 40.00    Pack years: 40.00    Last attempt to quit: 3/31/2015    Years since quitting: 3.6    Smokeless Tobacco Never Used   Tobacco Comment    pt quit smoking year ago     alcohol use:    Social History     Substance and Sexual Activity   Alcohol Use No               Occupation:  [employer]    ALLERGIES:  No Known Allergies    CURRENT MEDICATIONS:    Current Outpatient Medications   Medication Sig Dispense Refill    albuterol-ipratropium (DUO-NEB) 2.5 mg-0.5 mg/3 mL nebulizer solution NEBULIZE 1 vial EVERY 6 HOURS AS NEEDED  3    allopurinol (ZYLOPRIM) 100 MG tablet TAKE 1 TABLET(S) BY MOUTH ONCE A DAY to prevent gout  6    bethanechol (URECHOLINE) 25 MG Tab Take 2 tablets (50 mg total) by mouth 3 (three) times daily. 180 tablet 1    cholecalciferol, vitamin D3, (VITAMIN D3) 1,000 unit capsule Take 1,000 Units by mouth once daily.      CRANBERRY FRUIT EXTRACT (CRANBERRY CONCENTRATE ORAL) Take by mouth.      docusate sodium (COLACE) 100 MG capsule Take 100 mg by mouth 2 (two) times daily.      ELIQUIS 5 mg Tab Take 5 mg by mouth 2 (two) times daily.  5    fluticasone-salmeterol 500-50 mcg/dose (ADVAIR DISKUS) 500-50 mcg/dose DsDv diskus inhaler Inhale 1 puff into the lungs 2 (two) times daily. Controller      furosemide (LASIX) 40 MG tablet Take 40 mg by mouth 2 (two) times daily.      gabapentin (NEURONTIN) 300 MG capsule Take 300 mg by mouth 3 (three) times daily.      hydrALAZINE (APRESOLINE) 25 MG tablet Take 25 mg by mouth 3 (three) times daily.      ipratropium (ATROVENT) 0.02 % nebulizer solution Take 500 mcg by nebulization 4 (four) times daily. Rescue      primidone (MYSOLINE) 50 MG Tab Take by mouth.      ranitidine (ZANTAC) 150 MG tablet Take 150 mg by mouth 2 (two) times daily.      rosuvastatin (CRESTOR) 20 MG tablet Take 20 mg by mouth once daily.      tamsulosin (FLOMAX) 0.4 mg Cap Take 1 capsule (0.4 mg total) by mouth once daily. 90 capsule 3    VENTOLIN HFA 90 mcg/actuation inhaler Inhale 2 puffs into the lungs 4 (four) times daily.  3    warfarin (COUMADIN) 5 MG  "tablet   3    tiotropium (SPIRIVA) 18 mcg inhalation capsule Inhale 18 mcg into the lungs once daily. Controller       No current facility-administered medications for this visit.                   REVIEW OF SYSTEMS:   No acute changes from previous encounter dated 3/31/2016 with exceptions mentioned in HPI.             PHYSICAL EXAM:  Vitals:    12/06/18 1143   BP: 111/64   Pulse: 75   SpO2: (!) 88%   Weight: 108 kg (238 lb)   Height: 5' 6" (1.676 m)   PainSc:   7   PainLoc: Generalized     Body mass index is 38.41 kg/m².     GENERAL:  well develop; no apparent distress  HEENT:  no nasal congestion; no discharge noted; class 4 modified mallampatti.  Edentulous.   NECK:  supple; no palpable masses.  CARDIO: regular rate and rhythm  PULM:  clear to auscultation bilaterally; no intercostals retractions; no accessory muscle usage   ABDOMEN:  soft nontender/nondistended.  +bowel sound  EXTREMITIES no cc; +lower extremities edema  NEURO:  CN II-XII intact.  5/5 motor in all extremities.  sensation grossly intact   to light touch.  PSYCH:  normal affect.  Alert and oriented x 4    LABS  Pulmonary Functions Testing Results: 4/4/16 poor effort; ratio 59%; fvc 50%; fev1 44%  ABG none  CXR:  3/31/16 basilar atelectasis  CT CHEST:  - no honeycombing.  No effusion or consolidation  Echo 4/4/16 ef 45%; concentric remodelling    Split 5/13/16 ahi of 114 with min sat of 74%; optimal cpap was not achieved.    Titration 8/8/16 optimal bipap of 19/10  Ct 4/29/16 elevation of right hemidiaphragm  Sniff +limited excursion of right hemidiaphragm    ASSESSMENT  Problem List Items Addressed This Visit     COPD (chronic obstructive pulmonary disease)    Overview     currently on spiriva and albuterol.           Diaphragm paralysis    Overview     documented on sniff.  Difficulty with supine position.  More comfortable on right side.  Not candidate for plication.         Dyspnea on exertion    Overview     chronic and most likely " multifactorial.  HFrEF + habitus + diastolic dysfunction + decondition.  Echo suggestive of diastolic dysfunction, reduce ef with elevated bnp.  pft with restrictive physiololgy.  CT without significant parenchymal lung disease.  +right hemidiaphragm paresis.           DIVYA (obstructive sleep apnea) - Primary    Overview     Severe divya.  Poor compliance.  Not ideal candidate for oral appliance due to edentulous state.  After lengthy discussion, patient is willing for another trial of pap.  Will send for retitration.           Relevant Orders    CPAP Titration (Must have dx of DIVYA from previous sleep study.)          chf - mildly decreased ef with evidence of diastolic dysfunction.  Follow by Dr. Zee    Obesity - aware of need for drastic weight loss.  Difficult with exercise due djd and dyspnea    dispostion - recommend patient to form living.        Patient will No Follow-up on file. md/np

## 2018-12-18 ENCOUNTER — TELEPHONE (OUTPATIENT)
Dept: PULMONOLOGY | Facility: CLINIC | Age: 73
End: 2018-12-18

## 2018-12-18 NOTE — TELEPHONE ENCOUNTER
Left message on voicemail letting patient know we are still waiting on his insurance to approve sleep study.

## 2018-12-18 NOTE — TELEPHONE ENCOUNTER
----- Message from Madelin Han sent at 12/18/2018  9:50 AM CST -----  Contact: self [phone]  ..Needs Advice    Reason for call:        Communication Preference:phone     Additional Information: pt states he needs to schedule his sleep study states he's been waiting please call back to discuss

## 2018-12-24 ENCOUNTER — TELEPHONE (OUTPATIENT)
Dept: PULMONOLOGY | Facility: CLINIC | Age: 73
End: 2018-12-24

## 2018-12-24 NOTE — TELEPHONE ENCOUNTER
----- Message from Neva Flores sent at 12/24/2018  9:17 AM CST -----  Contact: [phone]  Pt is requesting to you regarding his sleep study that was suppose to be schedule for him. Pl call pt [phone]. Thanks......Marianna

## 2018-12-24 NOTE — TELEPHONE ENCOUNTER
Spoke with patient letting him know we will call him back on Wednesday to schedule when Lelia gets back to work.

## 2018-12-26 ENCOUNTER — TELEPHONE (OUTPATIENT)
Dept: SLEEP MEDICINE | Facility: HOSPITAL | Age: 73
End: 2018-12-26

## 2018-12-26 NOTE — TELEPHONE ENCOUNTER
----- Message from Robyn Jaeger MA sent at 12/26/2018  8:18 AM CST -----      ----- Message -----  From: Robyn Jaeger MA  Sent: 12/24/2018   9:43 AM  To: Robyn Jaeger MA        Pt is requesting to you regarding his sleep study that was suppose to be schedule for him. Pl call pt [phone]

## 2019-01-02 ENCOUNTER — TELEPHONE (OUTPATIENT)
Dept: SLEEP MEDICINE | Facility: HOSPITAL | Age: 74
End: 2019-01-02

## 2019-01-02 NOTE — TELEPHONE ENCOUNTER
Patient stated he will call back at later time reschedule sleep study as he can not left out the house.

## 2019-04-16 ENCOUNTER — TELEPHONE (OUTPATIENT)
Dept: UROLOGY | Facility: CLINIC | Age: 74
End: 2019-04-16

## 2019-04-16 ENCOUNTER — OFFICE VISIT (OUTPATIENT)
Dept: UROLOGY | Facility: CLINIC | Age: 74
End: 2019-04-16
Payer: MEDICARE

## 2019-04-16 VITALS
BODY MASS INDEX: 38.25 KG/M2 | DIASTOLIC BLOOD PRESSURE: 70 MMHG | HEIGHT: 66 IN | WEIGHT: 238 LBS | SYSTOLIC BLOOD PRESSURE: 156 MMHG

## 2019-04-16 DIAGNOSIS — N52.9 ED (ERECTILE DYSFUNCTION) OF ORGANIC ORIGIN: ICD-10-CM

## 2019-04-16 DIAGNOSIS — R33.9 INCOMPLETE EMPTYING OF BLADDER: ICD-10-CM

## 2019-04-16 DIAGNOSIS — R35.1 NOCTURIA MORE THAN TWICE PER NIGHT: Primary | ICD-10-CM

## 2019-04-16 DIAGNOSIS — N40.1 BPH WITH OBSTRUCTION/LOWER URINARY TRACT SYMPTOMS: ICD-10-CM

## 2019-04-16 DIAGNOSIS — N13.8 BPH WITH OBSTRUCTION/LOWER URINARY TRACT SYMPTOMS: ICD-10-CM

## 2019-04-16 PROCEDURE — 99999 PR PBB SHADOW E&M-EST. PATIENT-LVL III: CPT | Mod: PBBFAC,,, | Performed by: UROLOGY

## 2019-04-16 PROCEDURE — 99214 OFFICE O/P EST MOD 30 MIN: CPT | Mod: S$GLB,,, | Performed by: UROLOGY

## 2019-04-16 PROCEDURE — 99214 PR OFFICE/OUTPT VISIT, EST, LEVL IV, 30-39 MIN: ICD-10-PCS | Mod: S$GLB,,, | Performed by: UROLOGY

## 2019-04-16 PROCEDURE — 99999 PR PBB SHADOW E&M-EST. PATIENT-LVL III: ICD-10-PCS | Mod: PBBFAC,,, | Performed by: UROLOGY

## 2019-04-16 RX ORDER — BETHANECHOL CHLORIDE 25 MG/1
50 TABLET ORAL 3 TIMES DAILY
Qty: 180 TABLET | Refills: 1 | Status: SHIPPED | OUTPATIENT
Start: 2019-04-16 | End: 2019-11-05 | Stop reason: SDUPTHER

## 2019-04-16 RX ORDER — TAMSULOSIN HYDROCHLORIDE 0.4 MG/1
0.4 CAPSULE ORAL DAILY
Qty: 90 CAPSULE | Refills: 3 | Status: SHIPPED | OUTPATIENT
Start: 2019-04-16 | End: 2019-11-05 | Stop reason: SDUPTHER

## 2019-04-16 NOTE — TELEPHONE ENCOUNTER
----- Message from Barrie Kam sent at 4/16/2019 12:09 PM CDT -----  Contact: Self/[phone]  Type: Patient Call Back    Who called:patient    What is the request in detail:The patient would like to know if the office received his lab work    Would the patient rather a call back or a response via [facility]? Call back    Best call back number:[phone]    Thank you

## 2019-04-16 NOTE — PROGRESS NOTES
Subjective:       Patient ID: Flaquito Jones is a 73 y.o. male who was last seen in this office Visit date not found    Chief Complaint:   Chief Complaint   Patient presents with    Benign Prostatic Hypertrophy     6 month f/u with psa        History of Present Illness  Benign Prostatic Hypertrophy  Patient complains of lower urinary tract symptoms. He reports frequency, incomplete emptying and nocturia two times a night. He denies straining and urgency. Patient states symptoms are of mild severity. Onset of symptoms was several years ago and was gradual in onset. He has no personal history and no family history of prostate cancer. He reports a history of no complicating symptoms. He denies flank pain, gross hematuria, kidney stones and recurrent UTI.  He does well with Flomax and Bethanechol.      ACTIVE MEDICAL ISSUES:  Patient Active Problem List   Diagnosis    Thoracic or lumbosacral neuritis or radiculitis, unspecified    DIVYA (obstructive sleep apnea)    Chronic diastolic congestive heart failure    Chest pain, atypical    Essential hypertension    HLD (hyperlipidemia)    DVT (deep venous thrombosis)    Diaphragm paralysis    COPD (chronic obstructive pulmonary disease)    Dyspnea on exertion       ALLERGIES AND MEDICATIONS: updated and reviewed.  Review of patient's allergies indicates:  No Known Allergies  Current Outpatient Medications   Medication Sig    albuterol-ipratropium (DUO-NEB) 2.5 mg-0.5 mg/3 mL nebulizer solution NEBULIZE 1 vial EVERY 6 HOURS AS NEEDED    allopurinol (ZYLOPRIM) 100 MG tablet TAKE 1 TABLET(S) BY MOUTH ONCE A DAY to prevent gout    bethanechol (URECHOLINE) 25 MG Tab Take 2 tablets (50 mg total) by mouth 3 (three) times daily.    cholecalciferol, vitamin D3, (VITAMIN D3) 1,000 unit capsule Take 1,000 Units by mouth once daily.    CRANBERRY FRUIT EXTRACT (CRANBERRY CONCENTRATE ORAL) Take by mouth.    docusate sodium (COLACE) 100 MG capsule Take 100 mg by mouth 2  (two) times daily.    ELIQUIS 5 mg Tab Take 5 mg by mouth 2 (two) times daily.    fluticasone-salmeterol 500-50 mcg/dose (ADVAIR DISKUS) 500-50 mcg/dose DsDv diskus inhaler Inhale 1 puff into the lungs 2 (two) times daily. Controller    furosemide (LASIX) 40 MG tablet Take 40 mg by mouth 2 (two) times daily.    gabapentin (NEURONTIN) 300 MG capsule Take 300 mg by mouth 3 (three) times daily.    hydrALAZINE (APRESOLINE) 25 MG tablet Take 25 mg by mouth 3 (three) times daily.    ipratropium (ATROVENT) 0.02 % nebulizer solution Take 500 mcg by nebulization 4 (four) times daily. Rescue    primidone (MYSOLINE) 50 MG Tab Take by mouth.    ranitidine (ZANTAC) 150 MG tablet Take 150 mg by mouth 2 (two) times daily.    rosuvastatin (CRESTOR) 20 MG tablet Take 20 mg by mouth once daily.    tamsulosin (FLOMAX) 0.4 mg Cap Take 1 capsule (0.4 mg total) by mouth once daily.    tiotropium (SPIRIVA) 18 mcg inhalation capsule Inhale 18 mcg into the lungs once daily. Controller    VENTOLIN HFA 90 mcg/actuation inhaler Inhale 2 puffs into the lungs 4 (four) times daily.    warfarin (COUMADIN) 5 MG tablet      No current facility-administered medications for this visit.        Review of Systems   Constitutional: Negative for activity change, fatigue, fever and unexpected weight change.   HENT: Negative for congestion.    Eyes: Negative for redness.   Respiratory: Negative for chest tightness and shortness of breath.    Cardiovascular: Negative for chest pain and leg swelling.   Gastrointestinal: Negative for abdominal pain, constipation, diarrhea, nausea and vomiting.   Genitourinary: Negative for dysuria, flank pain, frequency, hematuria, penile pain, penile swelling, scrotal swelling, testicular pain and urgency.   Musculoskeletal: Negative for arthralgias and back pain.   Neurological: Negative for dizziness and light-headedness.   Psychiatric/Behavioral: Negative for behavioral problems and confusion. The patient is  "not nervous/anxious.    All other systems reviewed and are negative.      Objective:      Vitals:    04/16/19 1352   BP: (!) 156/70   Weight: 108 kg (238 lb)   Height: 5' 6" (1.676 m)     Physical Exam   Nursing note and vitals reviewed.  Constitutional: He is oriented to person, place, and time. He appears well-developed and well-nourished.   HENT:   Head: Normocephalic.   Eyes: Conjunctivae are normal.   Neck: Normal range of motion. Neck supple. No tracheal deviation present. No thyromegaly present.   Cardiovascular: Normal rate and normal heart sounds.    Pulmonary/Chest: Effort normal and breath sounds normal. No respiratory distress. He has no wheezes.   Abdominal: Soft. Bowel sounds are normal. There is no hepatosplenomegaly. There is no tenderness. There is no rebound and no CVA tenderness. No hernia.   Musculoskeletal: Normal range of motion. He exhibits no edema or tenderness.   Lymphadenopathy:     He has no cervical adenopathy.   Neurological: He is alert and oriented to person, place, and time.   Skin: Skin is warm and dry. No rash noted. No erythema.     Psychiatric: He has a normal mood and affect. His behavior is normal. Judgment and thought content normal.       Urine dipstick shows negative for all components.  Micro exam: negative for WBC's or RBC's.      4/13/2019  PSA 1.3        Assessment:       1. Nocturia more than twice per night    2. BPH with obstruction/lower urinary tract symptoms    3. Incomplete emptying of bladder    4. ED (erectile dysfunction) of organic origin          Plan:       1. BPH with obstruction/lower urinary tract symptoms  JENNIFER next time  - tamsulosin (FLOMAX) 0.4 mg Cap; Take 1 capsule (0.4 mg total) by mouth once daily.  Dispense: 90 capsule; Refill: 3  - bethanechol (URECHOLINE) 25 MG Tab; Take 2 tablets (50 mg total) by mouth 3 (three) times daily.  Dispense: 180 tablet; Refill: 1    2. Nocturia more than twice per night  Limit evening fluids    3. Incomplete emptying " of bladder  stable    4. ED (erectile dysfunction) of organic origin  Declined treatment            Follow up in about 6 months (around 10/16/2019) for Follow up.

## 2019-09-20 ENCOUNTER — TELEPHONE (OUTPATIENT)
Dept: UROLOGY | Facility: CLINIC | Age: 74
End: 2019-09-20

## 2019-09-20 NOTE — TELEPHONE ENCOUNTER
----- Message from Maggie Zambrano sent at 9/20/2019  4:11 PM CDT -----  Contact: Self   Type: Patient Call Back    Who called: self   What is the request in detail: asking to speak with  , personal     Can the clinic reply by [facility]? Call     Would the patient rather a call back or a response via [facility]? Call     Best call back number: [phone]    Additional Information:

## 2019-11-05 ENCOUNTER — OFFICE VISIT (OUTPATIENT)
Dept: UROLOGY | Facility: CLINIC | Age: 74
End: 2019-11-05
Payer: MEDICARE

## 2019-11-05 VITALS
BODY MASS INDEX: 38.25 KG/M2 | WEIGHT: 238 LBS | DIASTOLIC BLOOD PRESSURE: 70 MMHG | HEIGHT: 66 IN | SYSTOLIC BLOOD PRESSURE: 130 MMHG

## 2019-11-05 DIAGNOSIS — R33.9 INCOMPLETE EMPTYING OF BLADDER: ICD-10-CM

## 2019-11-05 DIAGNOSIS — N52.9 ED (ERECTILE DYSFUNCTION) OF ORGANIC ORIGIN: ICD-10-CM

## 2019-11-05 DIAGNOSIS — N40.1 BPH WITH OBSTRUCTION/LOWER URINARY TRACT SYMPTOMS: Primary | ICD-10-CM

## 2019-11-05 DIAGNOSIS — N13.8 BPH WITH OBSTRUCTION/LOWER URINARY TRACT SYMPTOMS: Primary | ICD-10-CM

## 2019-11-05 DIAGNOSIS — R35.1 NOCTURIA MORE THAN TWICE PER NIGHT: ICD-10-CM

## 2019-11-05 PROCEDURE — 99214 PR OFFICE/OUTPT VISIT, EST, LEVL IV, 30-39 MIN: ICD-10-PCS | Mod: S$GLB,,, | Performed by: UROLOGY

## 2019-11-05 PROCEDURE — 99999 PR PBB SHADOW E&M-EST. PATIENT-LVL III: CPT | Mod: PBBFAC,,, | Performed by: UROLOGY

## 2019-11-05 PROCEDURE — 99999 PR PBB SHADOW E&M-EST. PATIENT-LVL III: ICD-10-PCS | Mod: PBBFAC,,, | Performed by: UROLOGY

## 2019-11-05 PROCEDURE — 99214 OFFICE O/P EST MOD 30 MIN: CPT | Mod: S$GLB,,, | Performed by: UROLOGY

## 2019-11-05 RX ORDER — DICLOFENAC SODIUM 75 MG/1
75 TABLET, DELAYED RELEASE ORAL 2 TIMES DAILY
COMMUNITY

## 2019-11-05 RX ORDER — BETHANECHOL CHLORIDE 25 MG/1
50 TABLET ORAL 3 TIMES DAILY
Qty: 180 TABLET | Refills: 1 | Status: SHIPPED | OUTPATIENT
Start: 2019-11-05

## 2019-11-05 RX ORDER — TAMSULOSIN HYDROCHLORIDE 0.4 MG/1
0.4 CAPSULE ORAL DAILY
Qty: 90 CAPSULE | Refills: 3 | Status: SHIPPED | OUTPATIENT
Start: 2019-11-05

## 2019-11-05 RX ORDER — OLOPATADINE HYDROCHLORIDE 1 MG/ML
1 SOLUTION/ DROPS OPHTHALMIC 2 TIMES DAILY
COMMUNITY

## 2019-11-05 NOTE — PROGRESS NOTES
Subjective:       Patient ID: Flaquito Jones is a 74 y.o. male who was last seen in this office Visit date not found    Chief Complaint:   Chief Complaint   Patient presents with    Benign Prostatic Hypertrophy     6 month f/u pt states he is doing well        History of Present Illness  Benign Prostatic Hypertrophy  Patient complains of lower urinary tract symptoms. He reports frequency, incomplete emptying and nocturia two times a night. He denies straining and urgency. Patient states symptoms are of mild severity. Onset of symptoms was several years ago and was gradual in onset. He has no personal history and no family history of prostate cancer. He reports a history of no complicating symptoms. He denies flank pain, gross hematuria, kidney stones and recurrent UTI.  He does well with Flomax and Bethanechol.    He has noted a strong odor to his urine.    Cranberry seems to help.    ACTIVE MEDICAL ISSUES:  Patient Active Problem List   Diagnosis    Thoracic or lumbosacral neuritis or radiculitis, unspecified    DIVYA (obstructive sleep apnea)    Chronic diastolic congestive heart failure    Chest pain, atypical    Essential hypertension    HLD (hyperlipidemia)    DVT (deep venous thrombosis)    Diaphragm paralysis    COPD (chronic obstructive pulmonary disease)    Dyspnea on exertion       ALLERGIES AND MEDICATIONS: updated and reviewed.  Review of patient's allergies indicates:  No Known Allergies  Current Outpatient Medications   Medication Sig    albuterol-ipratropium (DUO-NEB) 2.5 mg-0.5 mg/3 mL nebulizer solution NEBULIZE 1 vial EVERY 6 HOURS AS NEEDED    allopurinol (ZYLOPRIM) 100 MG tablet TAKE 1 TABLET(S) BY MOUTH ONCE A DAY to prevent gout    bethanechol (URECHOLINE) 25 MG Tab Take 2 tablets (50 mg total) by mouth 3 (three) times daily.    cholecalciferol, vitamin D3, (VITAMIN D3) 1,000 unit capsule Take 1,000 Units by mouth once daily.    CRANBERRY FRUIT EXTRACT (CRANBERRY CONCENTRATE  ORAL) Take by mouth.    diclofenac (VOLTAREN) 75 MG EC tablet Take 75 mg by mouth 2 (two) times daily.    docusate sodium (COLACE) 100 MG capsule Take 100 mg by mouth 2 (two) times daily.    ELIQUIS 5 mg Tab Take 5 mg by mouth 2 (two) times daily.    fluticasone-salmeterol 500-50 mcg/dose (ADVAIR DISKUS) 500-50 mcg/dose DsDv diskus inhaler Inhale 1 puff into the lungs 2 (two) times daily. Controller    furosemide (LASIX) 40 MG tablet Take 40 mg by mouth 2 (two) times daily.    gabapentin (NEURONTIN) 300 MG capsule Take 300 mg by mouth 3 (three) times daily.    hydrALAZINE (APRESOLINE) 25 MG tablet Take 25 mg by mouth 3 (three) times daily.    ipratropium (ATROVENT) 0.02 % nebulizer solution Take 500 mcg by nebulization 4 (four) times daily. Rescue    neomycin/polymyxin B/dexametha (MAXITROL OPHT) Apply to eye.    olopatadine (PATANOL) 0.1 % ophthalmic solution 1 drop 2 (two) times daily.    primidone (MYSOLINE) 50 MG Tab Take by mouth.    ranitidine (ZANTAC) 150 MG tablet Take 150 mg by mouth 2 (two) times daily.    rosuvastatin (CRESTOR) 20 MG tablet Take 20 mg by mouth once daily.    tamsulosin (FLOMAX) 0.4 mg Cap Take 1 capsule (0.4 mg total) by mouth once daily.    tiotropium (SPIRIVA) 18 mcg inhalation capsule Inhale 18 mcg into the lungs once daily. Controller    VENTOLIN HFA 90 mcg/actuation inhaler Inhale 2 puffs into the lungs 4 (four) times daily.    warfarin (COUMADIN) 5 MG tablet      No current facility-administered medications for this visit.        Review of Systems   Constitutional: Negative for activity change, fatigue, fever and unexpected weight change.   HENT: Negative for congestion.    Eyes: Negative for redness.   Respiratory: Negative for chest tightness and shortness of breath.    Cardiovascular: Negative for chest pain and leg swelling.   Gastrointestinal: Negative for abdominal pain, constipation, diarrhea, nausea and vomiting.   Genitourinary: Negative for dysuria, flank  "pain, frequency, hematuria, penile pain, penile swelling, scrotal swelling, testicular pain and urgency.   Musculoskeletal: Negative for arthralgias and back pain.   Neurological: Negative for dizziness and light-headedness.   Psychiatric/Behavioral: Negative for behavioral problems and confusion. The patient is not nervous/anxious.    All other systems reviewed and are negative.      Objective:      Vitals:    11/05/19 1409   BP: 130/70   BP Location: Right arm   Patient Position: Sitting   BP Method: Large (Manual)   Weight: 108 kg (238 lb)   Height: 5' 6" (1.676 m)     Physical Exam   Nursing note and vitals reviewed.  Constitutional: He is oriented to person, place, and time. He appears well-developed and well-nourished.   HENT:   Head: Normocephalic.   Eyes: Conjunctivae are normal.   Neck: Normal range of motion. No tracheal deviation present. No thyromegaly present.   Cardiovascular: Normal rate and normal heart sounds.    Pulmonary/Chest: Effort normal and breath sounds normal. No respiratory distress. He has no wheezes.   Abdominal: Soft. He exhibits no distension and no mass. There is no hepatosplenomegaly. There is no tenderness. There is no rebound, no guarding and no CVA tenderness. No hernia. Hernia confirmed negative in the right inguinal area and confirmed negative in the left inguinal area.   Genitourinary: Rectum normal, testes normal and penis normal. Rectal exam shows no external hemorrhoid, no mass and no tenderness. Prostate is enlarged. Prostate is not tender. Right testis shows no mass and no tenderness. Left testis shows no mass and no tenderness.       Musculoskeletal: He exhibits no edema or tenderness.   Lymphadenopathy: No inguinal adenopathy noted on the right or left side.   Neurological: He is alert and oriented to person, place, and time.   Skin: Skin is warm and dry. No rash noted. No erythema.     Psychiatric: He has a normal mood and affect. His behavior is normal. Judgment and " thought content normal.       Urine dipstick shows negative for all components.  Micro exam: negative for WBC's or RBC's.    Assessment:       1. BPH with obstruction/lower urinary tract symptoms    2. Nocturia more than twice per night    3. Incomplete emptying of bladder    4. ED (erectile dysfunction) of organic origin          Plan:       1. BPH with obstruction/lower urinary tract symptoms    - Prostate Specific Antigen, Diagnostic; Future  - tamsulosin (FLOMAX) 0.4 mg Cap; Take 1 capsule (0.4 mg total) by mouth once daily.  Dispense: 90 capsule; Refill: 3  - bethanechol (URECHOLINE) 25 MG Tab; Take 2 tablets (50 mg total) by mouth 3 (three) times daily.  Dispense: 180 tablet; Refill: 1    2. Nocturia more than twice per night  Limit evening fluids    3. Incomplete emptying of bladder  stable  - Urine culture    4. ED (erectile dysfunction) of organic origin  declined            No follow-ups on file.

## 2019-11-14 ENCOUNTER — TELEPHONE (OUTPATIENT)
Dept: UROLOGY | Facility: CLINIC | Age: 74
End: 2019-11-14

## 2019-11-14 DIAGNOSIS — R39.89 SUSPECTED UTI: Primary | ICD-10-CM

## 2019-11-14 NOTE — TELEPHONE ENCOUNTER
Can an external order for a urine culture please be place as pt will be going to outside lab.-tiesha

## 2019-11-14 NOTE — TELEPHONE ENCOUNTER
----- Message from Keyona Jaime sent at 11/14/2019  8:33 AM CST -----  Contact: self  Type:  Test Results    Who Called: SELF    Name of Test (Lab/Mammo/Etc):URINE culture    Date of Test: 11/5/19    Ordering Provider: Dr Lucero    Where the test was performed:[address]    Would the patient rather a call back or a response via [facility]? call    Best Call Back Number:

## 2020-02-26 ENCOUNTER — TELEPHONE (OUTPATIENT)
Dept: UROLOGY | Facility: CLINIC | Age: 75
End: 2020-02-26

## 2020-02-26 NOTE — TELEPHONE ENCOUNTER
----- Message from Bruce Hamm sent at 2/26/2020  8:49 AM CST -----  Contact: Self: [phone]  Type: Patient Call Back    Who called:Self    What is the request in detail:Patient returning missed call to office    Can the clinic reply by [facility]? NO    Would the patient rather a call back or a response via [facility]?     Best call back number:[phone]          
Spoke to pt's wife  she states pt went to urgent on Monday an was given macrodantin for a uti but states he is still running fever an having symptoms of uti. I advised her since he is not feeling better an we dont have a physician in today she should have him see pcp as they can evaluate him an check his urine.  states she is going to bring him to  er for evaluation. tiesha  
20

## 2020-02-26 NOTE — TELEPHONE ENCOUNTER
----- Message from Hilda Howard sent at 2/26/2020  8:31 AM CST -----  Contact: Self   Type: Patient Call Back    Who called: Self     What is the request in detail:patient states he went to Urgent Care for a UTI and he couldn't walk . He was told to call his doctor to follow up     Can the clinic reply by [facility]? No     Would the patient rather a call back or a response via [facility]?  Call     Best call back number:[phone]

## 2020-03-11 ENCOUNTER — TELEPHONE (OUTPATIENT)
Dept: PULMONOLOGY | Facility: CLINIC | Age: 75
End: 2020-03-11

## 2020-03-11 ENCOUNTER — TELEPHONE (OUTPATIENT)
Dept: UROLOGY | Facility: CLINIC | Age: 75
End: 2020-03-11

## 2020-03-11 NOTE — TELEPHONE ENCOUNTER
Spoke to pt I advised to increase fluid intake if not better please contact office we will get pt in to see np or  whomever has first available. Pt states he understands an he has therapy coming in but will contact if blood in continuing.tiesha

## 2020-03-11 NOTE — TELEPHONE ENCOUNTER
Spoke with patient. He reports that he was recently discharged from the hospital and is unsure whether his bipap settings should be changed or not? Patient reports having a f/u appointment with Dr. Evans [facility] on March 23,  who he reports ordered his bipap. Instructed patient if settings were not ordered for change upon his discharge to continue with current settings until his appointment with Dr. Evans. Patient verbalized understanding with no additional questions or concerns at this time.

## 2020-03-11 NOTE — TELEPHONE ENCOUNTER
----- Message from Paris Cates MA sent at 3/11/2020  8:32 AM CDT -----  Contact: self/[phone]  Pt is requesting a call back in reference to just being discharged from the hospital. No other info was given

## 2020-03-11 NOTE — TELEPHONE ENCOUNTER
----- Message from Will Mcdonald sent at 3/11/2020  8:18 AM CDT -----  Contact: [place] [phone]  Type: Patient Call Back    Who called:[place]    What is the request in detail: The patient is requesting a call back from the staff. He states that in the hospital he had a condom catheter. He is still passing blood through the penis. The hospital never addressed it and he would like some advice.    Can the clinic reply by [facility]?no    Would the patient rather a call back or a response via [facility]? Call back     Best call back number:[phone]

## 2020-04-24 ENCOUNTER — TELEPHONE (OUTPATIENT)
Dept: UROLOGY | Facility: CLINIC | Age: 75
End: 2020-04-24

## 2020-04-24 NOTE — TELEPHONE ENCOUNTER
Lt message appt for 05/12/20 will need to be zuri do to  being out of the office do to the corona virus. Okay to zuri on 's next available date-tiesha

## 2020-04-30 ENCOUNTER — TELEPHONE (OUTPATIENT)
Dept: UROLOGY | Facility: CLINIC | Age: 75
End: 2020-04-30

## 2020-04-30 NOTE — TELEPHONE ENCOUNTER
Lt message appt will need to be zuri do to  being out of the office do to the corona. Okay to zuri at his next available date or with michael bello. PT WILL NEED LABWORK PRIOR TO APPT-tiesha

## 2020-05-05 ENCOUNTER — TELEPHONE (OUTPATIENT)
Dept: UROLOGY | Facility: CLINIC | Age: 75
End: 2020-05-05

## 2020-05-05 NOTE — TELEPHONE ENCOUNTER
----- Message from Alina Michael sent at 5/5/2020  2:30 PM CDT -----  Contact: pt  Type: Patient Call Back    Who called:pt    What is the request in detail:pt is calling in regards to blood work to be done in [place] [facility]. Calling for orders. Call pt    Can the clinic reply by [facility]?    Would the patient rather a call back or a response via [facility]? call    Best call back number:[phone] (home)       Additional Information:

## 2020-05-19 ENCOUNTER — TELEPHONE (OUTPATIENT)
Dept: UROLOGY | Facility: CLINIC | Age: 75
End: 2020-05-19

## 2020-05-19 NOTE — TELEPHONE ENCOUNTER
Lt message stating I need to know what type of orders need to be sent and I also need a fax number to the facility.-tiesha

## 2020-05-19 NOTE — TELEPHONE ENCOUNTER
----- Message from Keyona Jaime sent at 5/19/2020 10:29 AM CDT -----  Contact: SELF  Type: Patient Call Back    Who called:SELF    What is the request in detail:Pt need orders sent to [facility]    Can the clinic reply by [facility]?NO    Would the patient rather a call back or a response via [facility]? CALL    Best call back number: